# Patient Record
Sex: FEMALE | Race: WHITE | Employment: FULL TIME | URBAN - METROPOLITAN AREA
[De-identification: names, ages, dates, MRNs, and addresses within clinical notes are randomized per-mention and may not be internally consistent; named-entity substitution may affect disease eponyms.]

---

## 2017-03-03 ENCOUNTER — OFFICE VISIT (OUTPATIENT)
Dept: FAMILY MEDICINE CLINIC | Age: 27
End: 2017-03-03

## 2017-03-03 VITALS
BODY MASS INDEX: 42.15 KG/M2 | HEIGHT: 65 IN | HEART RATE: 68 BPM | WEIGHT: 253 LBS | SYSTOLIC BLOOD PRESSURE: 129 MMHG | RESPIRATION RATE: 22 BRPM | OXYGEN SATURATION: 98 % | TEMPERATURE: 98.3 F | DIASTOLIC BLOOD PRESSURE: 82 MMHG

## 2017-03-03 DIAGNOSIS — Z00.00 WELL WOMAN EXAM (NO GYNECOLOGICAL EXAM): Primary | ICD-10-CM

## 2017-03-03 DIAGNOSIS — E66.9 OBESITY (BMI 35.0-39.9 WITHOUT COMORBIDITY): ICD-10-CM

## 2017-03-03 NOTE — MR AVS SNAPSHOT
Visit Information Date & Time Provider Department Dept. Phone Encounter #  
 3/3/2017  1:00 PM Aldo Armas, Everton Hassan [phone] [id number] Follow-up Instructions Return in about 3 months (around 6/3/2017) for weight check. Upcoming Health Maintenance Date Due  
 HPV AGE 9Y-34Y (1 of 3 - Female 3 Dose Series) 8/18/2001 DTaP/Tdap/Td series (1 - Tdap) 8/18/2011 INFLUENZA AGE 9 TO ADULT 8/1/2016 PAP AKA CERVICAL CYTOLOGY 4/8/2017 Allergies as of 3/3/2017  Review Complete On: 3/3/2017 By: Aldo Armas MD  
 No Known Allergies Current Immunizations  Reviewed on 3/3/2017 Name Date Influenza Vaccine 11/1/2013 Reviewed by Kev Flaherty on 3/3/2017 at  1:03 PM  
You Were Diagnosed With   
  
 Codes Comments Well woman exam (no gynecological exam)    -  Primary ICD-10-CM: Z00.00 ICD-9-CM: V70.0 Obesity (BMI 35.0-39.9 without comorbidity) ([facility] [address]     ICD-10-CM: E54.2 ICD-9-CM: 278.00 Vitals OB Status Breastfeeding Preferred Pharmacy Pharmacy Name Phone [facility] [phone] Your Updated Medication List  
  
   
This list is accurate as of: 3/3/17  1:42 PM.  Always use your most recent med list.  
  
  
  
  
 meloxicam 7.5 mg tablet Commonly known as:  MOBIC Take 1 Tab by mouth daily. traMADol-acetaminophen 37.5-325 mg per tablet Commonly known as:  ULTRACET Take 1 Tab by mouth every six (6) hours as needed for Pain. Max Daily Amount: 4 Tabs. We Performed the Following CBC W/O DIFF [20006 CPT(R)] HEMOGLOBIN A1C WITH EAG [68436 CPT(R)] LIPID PANEL [42253 CPT(R)] METABOLIC PANEL, COMPREHENSIVE [26410 CPT(R)] TSH REFLEX TO T4 [[id number] Custom] Follow-up Instructions Return in about 3 months (around 6/3/2017) for weight check. Patient Instructions [email or web address] Introducing [facility] [place] [facility]! Dear Sarai Sanchez: Thank you for requesting a [employer] account. Our records indicate that you already have an active [employer] account. You can access your account anytime at [email or web address] Did you know that you can access your hospital and ER discharge instructions at any time in [employer]? You can also review all of your test results from your hospital stay or ER visit. Additional Information If you have questions, please visit the Frequently Asked Questions section of the [employer] website at [email or web address] Remember, [employer] is NOT to be used for urgent needs. For medical emergencies, dial 911. Now available from your iPhone and Android! Please provide this summary of care documentation to your next provider. Your primary care clinician is listed as Dalila Ames. If you have any questions after today's visit, please call [phone].

## 2017-03-03 NOTE — PROGRESS NOTES
Here for complete physical   Her main concern is weight gain  Has nutritionist and a     I reviewed with the resident the medical history and the resident's findings on the physical examination. I discussed with the resident the patient's diagnosis and concur with the plan.

## 2017-03-03 NOTE — PROGRESS NOTES
HPI:  Carroll Guallpa is a 32 y.o. female presenting for well woman exam.     Last pap smear 4 months ago, normal. Gets OB/GYN care at [facility]. When in [employer], weight was 170-175. After pregnancy, unable to lose weight. Working with , nutritionist.     - Paleo diet, discontinued d/t gallstones  - Currently using myplate.gov, 9986 jaylon a day  - Does not drink soda or added sugars  - Over 4-5 months, has lost 7 lbs  - High intensity interval exercises 5 days a week -- 30 min cardio, lifting muscle groups    Social History:   Does not drink EtOH or smoke. Allergies- reviewed:   No Known Allergies      Medications- reviewed:   Current Outpatient Prescriptions   Medication Sig    traMADol-acetaminophen (ULTRACET) 37.5-325 mg per tablet Take 1 Tab by mouth every six (6) hours as needed for Pain. Max Daily Amount: 4 Tabs.  meloxicam (MOBIC) 7.5 mg tablet Take 1 Tab by mouth daily. No current facility-administered medications for this visit. Past Medical History- reviewed:  Past Medical History:   Diagnosis Date    Anxiety 01/2014    Ovarian cyst     PTSD (post-traumatic stress disorder) 01/2013         Past Surgical History- reviewed:   No past surgical history on file. Family History - reviewed:  Family History   Problem Relation Age of Onset    Endometriosis Mother     Anemia Mother     Bleeding Prob Mother      Anemia    Headache Mother      due to anemia    Hypertension Mother    [facility] Migraines Mother     Thyroid Disease Mother     Liver Disease Father      cirrhosis d/t drinking; hepatitis B     Alcohol abuse Father     Asthma Sister          Social History - reviewed:  Social History     Social History    Marital status: SINGLE     Spouse name: N/A    Number of children: N/A    Years of education: N/A     Occupational History    Not on file.      Social History Main Topics    Smoking status: Never Smoker    Smokeless tobacco: Never Used    Alcohol use No    Drug use: No    Sexual activity: Yes     Partners: Male     Birth control/ protection: Pill     Other Topics Concern    Not on file     Social History Narrative         Immunizations - reviewed:   Immunization History   Administered Date(s) Administered    Influenza Vaccine 11/01/2013       Review of Systems   CONSTITUTIONAL: Denies: weight loss, fever  CARDIOVASCULAR: Denies: chest pain  RESPIRATORY: Denies: cough, shortness of breath  GI: Denies: abdominal pain, nausea, vomiting, diarrhea, constipation  MUSCULOSKELETAL: knee joint pain with running      Physical Exam  Visit Vitals    /82    Pulse 68    Temp 98.3 °F (36.8 °C) (Oral)    Resp 22    Ht 5' 5\" (1.651 m)    Wt 253 lb (114.8 kg)    SpO2 98%    BMI 42.1 kg/m2       General appearance - alert, well appearing, and in no distress  Eyes - EOMI  Nose - normal and patent, no erythema, discharge or polyps  Mouth - mucous membranes moist, pharynx normal without lesions  Chest - clear to auscultation, no wheezes, rales or rhonchi, symmetric air entry  Heart - normal rate, regular rhythm, normal S1, S2, no murmurs, rubs, clicks or gallops  Abdomen - soft, nontender, nondistended, no masses or organomegaly  Neurological - alert, oriented, normal speech, no focal findings or movement disorder noted  Extremities - peripheral pulses normal, no pedal edema, no clubbing or cyanosis  Skin - normal coloration and turgor, no rashes, no suspicious skin lesions noted      Assessment/Plan:    ICD-10-CM ICD-9-CM    1. Well woman exam (no gynecological exam) Z00.00 V70.0 CBC W/O DIFF      METABOLIC PANEL, COMPREHENSIVE      LIPID PANEL      TSH REFLEX TO T4   2. Obesity (BMI 35.0-39.9 without comorbidity) ([facility]) E66.9 278.00 CBC W/O DIFF      METABOLIC PANEL, COMPREHENSIVE      LIPID PANEL      TSH REFLEX TO T4      HEMOGLOBIN A1C WITH EAG         · Counseled re: diet, exercise, healthy lifestyle.  Gave link to help aid with weight loss ([email or web address])    · Appropriate labs, vaccines, imaging studies, and referrals ordered as listed above. Will check for possible metabolic derangements contributing to inability to lose weight. · Discussed the patient's BMI with her. The BMI follow up plan is as follows: already seeing nutritionist, . Will have patient f/u for weight monitoring. Follow-up Disposition:  Return in about 3 months (around 6/3/2017) for weight check. I have discussed the diagnosis with the patient and the intended plan as seen in the above orders. The patient has received an after-visit summary and questions were answered concerning future plans. I have discussed medication side effects and warnings with the patient as well. Informed pt to return to the office if new symptoms arise. Nigel Funez MD  Family Medicine Resident    Patient seen and discussed with Dr. Jovanna Castle, attending physician.

## 2017-03-04 LAB
ALBUMIN SERPL-MCNC: 4.2 G/DL (ref 3.5–5.5)
ALBUMIN/GLOB SERPL: 1.4 {RATIO} (ref 1.1–2.5)
ALP SERPL-CCNC: 86 IU/L (ref 39–117)
ALT SERPL-CCNC: 20 IU/L (ref 0–32)
AST SERPL-CCNC: 20 IU/L (ref 0–40)
BILIRUB SERPL-MCNC: 0.3 MG/DL (ref 0–1.2)
BUN SERPL-MCNC: 12 MG/DL (ref 6–20)
BUN/CREAT SERPL: 17 (ref 8–20)
CALCIUM SERPL-MCNC: 9 MG/DL (ref 8.7–10.2)
CHLORIDE SERPL-SCNC: 98 MMOL/L (ref 96–106)
CHOLEST SERPL-MCNC: 241 MG/DL (ref 100–199)
CO2 SERPL-SCNC: 24 MMOL/L (ref 18–29)
CREAT SERPL-MCNC: 0.71 MG/DL (ref 0.57–1)
ERYTHROCYTE [DISTWIDTH] IN BLOOD BY AUTOMATED COUNT: 14.1 % (ref 12.3–15.4)
EST. AVERAGE GLUCOSE BLD GHB EST-MCNC: 123 MG/DL
GLOBULIN SER CALC-MCNC: 2.9 G/DL (ref 1.5–4.5)
GLUCOSE SERPL-MCNC: 85 MG/DL (ref 65–99)
HBA1C MFR BLD: 5.9 % (ref 4.8–5.6)
HCT VFR BLD AUTO: 40.7 % (ref 34–46.6)
HDLC SERPL-MCNC: 47 MG/DL
HGB BLD-MCNC: 13.9 G/DL (ref 11.1–15.9)
INTERPRETATION, 910389: NORMAL
LDLC SERPL CALC-MCNC: 164 MG/DL (ref 0–99)
MCH RBC QN AUTO: 28.1 PG (ref 26.6–33)
MCHC RBC AUTO-ENTMCNC: 34.2 G/DL (ref 31.5–35.7)
MCV RBC AUTO: 82 FL (ref 79–97)
PLATELET # BLD AUTO: 315 X10E3/UL (ref 150–379)
POTASSIUM SERPL-SCNC: 4.6 MMOL/L (ref 3.5–5.2)
PROT SERPL-MCNC: 7.1 G/DL (ref 6–8.5)
RBC # BLD AUTO: 4.94 X10E6/UL (ref 3.77–5.28)
SODIUM SERPL-SCNC: 141 MMOL/L (ref 134–144)
TRIGL SERPL-MCNC: 151 MG/DL (ref 0–149)
TSH SERPL DL<=0.005 MIU/L-ACNC: 1.29 UIU/ML (ref 0.45–4.5)
VLDLC SERPL CALC-MCNC: 30 MG/DL (ref 5–40)
WBC # BLD AUTO: 9.3 X10E3/UL (ref 3.4–10.8)

## 2017-03-07 NOTE — PROGRESS NOTES
High lipids, though little evidence on initiating statin in young patient. Continues to be in pre-diabetic range. Suspect diet is not as strict as patient reports. Will discuss results with patient.

## 2022-10-05 ENCOUNTER — HOSPITAL ENCOUNTER (EMERGENCY)
Age: 32
Discharge: HOME | End: 2022-10-05
Attending: STUDENT IN AN ORGANIZED HEALTH CARE EDUCATION/TRAINING PROGRAM
Payer: MEDICAID

## 2022-10-05 VITALS
WEIGHT: 230 LBS | OXYGEN SATURATION: 100 % | DIASTOLIC BLOOD PRESSURE: 82 MMHG | SYSTOLIC BLOOD PRESSURE: 165 MMHG | HEIGHT: 65 IN | TEMPERATURE: 97.9 F | HEART RATE: 86 BPM | BODY MASS INDEX: 38.32 KG/M2 | RESPIRATION RATE: 18 BRPM

## 2022-10-05 DIAGNOSIS — M54.2 CERVICALGIA: Primary | ICD-10-CM

## 2022-10-05 DIAGNOSIS — S46.812A TRAPEZIUS MUSCLE STRAIN, LEFT, INITIAL ENCOUNTER: ICD-10-CM

## 2022-10-05 PROCEDURE — 74011636637 HC RX REV CODE- 636/637: Performed by: STUDENT IN AN ORGANIZED HEALTH CARE EDUCATION/TRAINING PROGRAM

## 2022-10-05 PROCEDURE — 74011250636 HC RX REV CODE- 250/636: Performed by: STUDENT IN AN ORGANIZED HEALTH CARE EDUCATION/TRAINING PROGRAM

## 2022-10-05 PROCEDURE — 99284 EMERGENCY DEPT VISIT MOD MDM: CPT

## 2022-10-05 PROCEDURE — 96374 THER/PROPH/DIAG INJ IV PUSH: CPT

## 2022-10-05 PROCEDURE — 74011000250 HC RX REV CODE- 250: Performed by: STUDENT IN AN ORGANIZED HEALTH CARE EDUCATION/TRAINING PROGRAM

## 2022-10-05 RX ORDER — LIDOCAINE 4 G/100G
1 PATCH TOPICAL EVERY 24 HOURS
Status: DISCONTINUED | OUTPATIENT
Start: 2022-10-05 | End: 2022-10-05 | Stop reason: HOSPADM

## 2022-10-05 RX ORDER — KETOROLAC TROMETHAMINE 30 MG/ML
30 INJECTION, SOLUTION INTRAMUSCULAR; INTRAVENOUS
Status: COMPLETED | OUTPATIENT
Start: 2022-10-05 | End: 2022-10-05

## 2022-10-05 RX ORDER — METHOCARBAMOL 750 MG/1
750 TABLET, FILM COATED ORAL 4 TIMES DAILY
Qty: 12 TABLET | Refills: 0 | Status: SHIPPED | OUTPATIENT
Start: 2022-10-05 | End: 2022-10-08

## 2022-10-05 RX ORDER — PREDNISONE 20 MG/1
60 TABLET ORAL
Status: COMPLETED | OUTPATIENT
Start: 2022-10-05 | End: 2022-10-05

## 2022-10-05 RX ORDER — PREDNISONE 50 MG/1
50 TABLET ORAL DAILY
Qty: 3 TABLET | Refills: 0 | Status: SHIPPED | OUTPATIENT
Start: 2022-10-06 | End: 2022-10-09

## 2022-10-05 RX ADMIN — PREDNISONE 60 MG: 20 TABLET ORAL at 20:45

## 2022-10-05 RX ADMIN — KETOROLAC TROMETHAMINE 30 MG: 30 INJECTION, SOLUTION INTRAMUSCULAR at 20:46

## 2022-10-06 NOTE — ED PROVIDER NOTES
Patient is a 27-year-old female who presents to ED complaining of left trapezius pain which started yesterday. Patient reports slight pain and a tingling sensation in her left trapezius. Patient reports today she has noticed a tingling sensation that travels to her shoulder and down the posterior aspect of her left arm and stops at the elbow. Patient denies any known injury. States she has full range of motion of the arm denies any true numbness. States it feels like when her foot \"falls asleep. \"  Patient denies any neck pain, headache, visual changes, speech difficulty, confusion, weakness. No meds prior to arrival.       Past Medical History:   Diagnosis Date    Anxiety 01/2014    Ovarian cyst     PTSD (post-traumatic stress disorder) 01/2013       No past surgical history on file.       Family History:   Problem Relation Age of Onset    Endometriosis Mother     Anemia Mother     Bleeding Prob Mother         Anemia    Headache Mother         due to anemia    Hypertension Mother     Migraines Mother     Thyroid Disease Mother     Liver Disease Father         cirrhosis d/t drinking; hepatitis B     Alcohol abuse Father     Asthma Sister        Social History     Socioeconomic History    Marital status: SINGLE     Spouse name: Not on file    Number of children: Not on file    Years of education: Not on file    Highest education level: Not on file   Occupational History    Not on file   Tobacco Use    Smoking status: Never    Smokeless tobacco: Never   Substance and Sexual Activity    Alcohol use: No     Alcohol/week: 15.8 standard drinks     Types: 12 Cans of beer, 7 Shots of liquor per week    Drug use: No    Sexual activity: Yes     Partners: Male     Birth control/protection: Pill   Other Topics Concern    Not on file   Social History Narrative    Not on file     Social Determinants of Health     Financial Resource Strain: Not on file   Food Insecurity: Not on file   Transportation Needs: Not on file Physical Activity: Not on file   Stress: Not on file   Social Connections: Not on file   Intimate Partner Violence: Not on file   Housing Stability: Not on file         ALLERGIES: Patient has no known allergies. Review of Systems   Constitutional:  Negative for activity change, appetite change, chills and fever. HENT:  Negative for congestion and sore throat. Eyes:  Negative for pain and visual disturbance. Respiratory:  Negative for cough and shortness of breath. Cardiovascular:  Negative for chest pain, palpitations and leg swelling. Gastrointestinal:  Negative for abdominal distention, abdominal pain, constipation, diarrhea, nausea and vomiting. Genitourinary:  Negative for decreased urine volume, dysuria, flank pain, frequency and urgency. Musculoskeletal:  Positive for myalgias. Negative for arthralgias, back pain, gait problem, joint swelling, neck pain and neck stiffness. Skin:  Negative for rash and wound. Allergic/Immunologic: Negative for immunocompromised state. Neurological:  Positive for numbness. Negative for dizziness, tremors, seizures, syncope, facial asymmetry, speech difficulty, weakness, light-headedness and headaches. Psychiatric/Behavioral:  Negative for confusion. All other systems reviewed and are negative. Vitals:    10/05/22 2028   BP: (!) 165/82   Pulse: 86   Resp: 18   Temp: 97.9 °F (36.6 °C)   SpO2: 100%   Weight: 104.3 kg (230 lb)   Height: 5' 5\" (1.651 m)            Physical Exam  Vitals and nursing note reviewed. Constitutional:       General: She is not in acute distress. Appearance: Normal appearance. She is well-developed. She is not toxic-appearing. HENT:      Head: Normocephalic and atraumatic. Nose: Nose normal.      Mouth/Throat:      Mouth: Mucous membranes are moist.   Eyes:      General: Lids are normal.      Extraocular Movements: Extraocular movements intact.       Conjunctiva/sclera: Conjunctivae normal.   Neck: Comments: No midline cervical tenderness or paraspinal muscle tenderness. Cardiovascular:      Rate and Rhythm: Normal rate and regular rhythm. Pulses: Normal pulses. Heart sounds: Normal heart sounds, S1 normal and S2 normal.   Pulmonary:      Effort: Pulmonary effort is normal. No accessory muscle usage. Breath sounds: Normal breath sounds. Abdominal:      Palpations: Abdomen is soft. Musculoskeletal:         General: Normal range of motion. Cervical back: Normal range of motion and neck supple. Comments: Left trapezius tenderness noted with muscle spasm. Full ROM of bilateral upper extremities. Sensation is intact to dull and sharp sensation bilaterally in upper extremities. Strength 5/5 with  strength, elbow flexion/extension bilaterally. No shoulder or elbow tenderness noted. Skin:     General: Skin is warm and dry. Capillary Refill: Capillary refill takes less than 2 seconds. Neurological:      General: No focal deficit present. Mental Status: She is alert and oriented to person, place, and time. Mental status is at baseline. Psychiatric:         Attention and Perception: Attention normal.         Mood and Affect: Mood and affect normal.         Speech: Speech normal.         Behavior: Behavior is cooperative. Thought Content: Thought content normal.         Cognition and Memory: Cognition normal.         Judgment: Judgment normal.        MDM  Number of Diagnoses or Management Options  Cervicalgia  Trapezius muscle strain, left, initial encounter  Diagnosis management comments: Patient presents with tingling sensation in left trapezius and left arm stopping at the elbow. No focal neurologic deficits on exam. Symptoms seem radicular in nature and patient has pinpoint muscle tenderness in left trapezius. Given prednisone and toradol with improvement of symptoms. Discussed treatment plan and follow-up with PCP.  Strict return to ER precautions discussed in detail with patient. All questions addressed and answered.        Amount and/or Complexity of Data Reviewed  Discuss the patient with other providers: yes (Dr. Martita Larsen, ED Attending )           Procedures

## 2022-10-06 NOTE — DISCHARGE INSTRUCTIONS
Take prednisone as prescribed. Alternate tylenol and ibuprofen every 4 hours as needed for pain. If you are experiencing muscle spasm take robaxin. . You can also use over the counter lidocaine patches to help with pain relief.

## 2022-10-06 NOTE — ED TRIAGE NOTES
Patient reports intermittent numbness from left shoulder blade to mid back and down to left elbow since yesterday.      VAN -     Denies any known injury or loss of bowel/bladder

## 2023-03-10 ENCOUNTER — NURSE TRIAGE (OUTPATIENT)
Dept: OTHER | Facility: CLINIC | Age: 33
End: 2023-03-10

## 2023-03-21 ENCOUNTER — HOSPITAL ENCOUNTER (EMERGENCY)
Age: 33
Discharge: HOME | End: 2023-03-21
Attending: STUDENT IN AN ORGANIZED HEALTH CARE EDUCATION/TRAINING PROGRAM
Payer: MEDICAID

## 2023-03-21 ENCOUNTER — APPOINTMENT (OUTPATIENT)
Dept: MRI IMAGING | Age: 33
End: 2023-03-21
Attending: INTERNAL MEDICINE
Payer: MEDICAID

## 2023-03-21 ENCOUNTER — APPOINTMENT (OUTPATIENT)
Dept: CT IMAGING | Age: 33
End: 2023-03-21
Attending: STUDENT IN AN ORGANIZED HEALTH CARE EDUCATION/TRAINING PROGRAM
Payer: MEDICAID

## 2023-03-21 ENCOUNTER — APPOINTMENT (OUTPATIENT)
Dept: NON INVASIVE DIAGNOSTICS | Age: 33
End: 2023-03-21
Attending: INTERNAL MEDICINE
Payer: MEDICAID

## 2023-03-21 VITALS
HEART RATE: 74 BPM | OXYGEN SATURATION: 99 % | RESPIRATION RATE: 14 BRPM | HEIGHT: 65 IN | DIASTOLIC BLOOD PRESSURE: 65 MMHG | SYSTOLIC BLOOD PRESSURE: 119 MMHG | WEIGHT: 229.94 LBS | TEMPERATURE: 98.4 F | BODY MASS INDEX: 38.31 KG/M2

## 2023-03-21 DIAGNOSIS — R20.0 RIGHT FACIAL NUMBNESS: Primary | ICD-10-CM

## 2023-03-21 LAB
ALBUMIN SERPL-MCNC: 3.3 G/DL (ref 3.5–5)
ALBUMIN/GLOB SERPL: 0.8 (ref 1.1–2.2)
ALP SERPL-CCNC: 52 U/L (ref 45–117)
ALT SERPL-CCNC: 23 U/L (ref 12–78)
ANION GAP SERPL CALC-SCNC: 4 MMOL/L (ref 5–15)
AST SERPL-CCNC: 26 U/L (ref 15–37)
ATRIAL RATE: 64 BPM
BASOPHILS # BLD: 0.1 K/UL (ref 0–0.1)
BASOPHILS NFR BLD: 1 % (ref 0–1)
BILIRUB SERPL-MCNC: 0.8 MG/DL (ref 0.2–1)
BUN SERPL-MCNC: 11 MG/DL (ref 6–20)
BUN/CREAT SERPL: 14 (ref 12–20)
CALCIUM SERPL-MCNC: 8.9 MG/DL (ref 8.5–10.1)
CALCULATED P AXIS, ECG09: 50 DEGREES
CALCULATED R AXIS, ECG10: 59 DEGREES
CALCULATED T AXIS, ECG11: 5 DEGREES
CHLORIDE SERPL-SCNC: 105 MMOL/L (ref 97–108)
CO2 SERPL-SCNC: 29 MMOL/L (ref 21–32)
CREAT SERPL-MCNC: 0.8 MG/DL (ref 0.55–1.02)
DIAGNOSIS, 93000: NORMAL
DIFFERENTIAL METHOD BLD: NORMAL
ECHO AO ASC DIAM: 2.7 CM
ECHO AO ASCENDING AORTA INDEX: 1.29 CM/M2
ECHO AV AREA PEAK VELOCITY: 2.4 CM2
ECHO AV AREA VTI: 2.4 CM2
ECHO AV AREA/BSA PEAK VELOCITY: 1.1 CM2/M2
ECHO AV AREA/BSA VTI: 1.1 CM2/M2
ECHO AV MEAN GRADIENT: 4 MMHG
ECHO AV MEAN VELOCITY: 0.9 M/S
ECHO AV PEAK GRADIENT: 6 MMHG
ECHO AV PEAK VELOCITY: 1.3 M/S
ECHO AV VELOCITY RATIO: 0.77
ECHO AV VTI: 24.3 CM
ECHO LA DIAMETER INDEX: 1.43 CM/M2
ECHO LA DIAMETER: 3 CM
ECHO LA VOL 2C: 31 ML (ref 22–52)
ECHO LA VOL 4C: 32 ML (ref 22–52)
ECHO LA VOL BP: 36 ML (ref 22–52)
ECHO LA VOL/BSA BIPLANE: 17 ML/M2 (ref 16–34)
ECHO LA VOLUME AREA LENGTH: 40 ML
ECHO LA VOLUME INDEX A2C: 15 ML/M2 (ref 16–34)
ECHO LA VOLUME INDEX A4C: 15 ML/M2 (ref 16–34)
ECHO LA VOLUME INDEX AREA LENGTH: 19 ML/M2 (ref 16–34)
ECHO LV E' LATERAL VELOCITY: 11 CM/S
ECHO LV E' SEPTAL VELOCITY: 12 CM/S
ECHO LV EDV A2C: 86 ML
ECHO LV EDV A4C: 124 ML
ECHO LV EDV BP: 102 ML (ref 56–104)
ECHO LV EDV INDEX A4C: 59 ML/M2
ECHO LV EDV INDEX BP: 49 ML/M2
ECHO LV EDV NDEX A2C: 41 ML/M2
ECHO LV EJECTION FRACTION A2C: 63 %
ECHO LV EJECTION FRACTION A4C: 61 %
ECHO LV EJECTION FRACTION BIPLANE: 61 % (ref 55–100)
ECHO LV ESV A2C: 32 ML
ECHO LV ESV A4C: 49 ML
ECHO LV ESV BP: 40 ML (ref 19–49)
ECHO LV ESV INDEX A2C: 15 ML/M2
ECHO LV ESV INDEX A4C: 23 ML/M2
ECHO LV ESV INDEX BP: 19 ML/M2
ECHO LV FRACTIONAL SHORTENING: 37 % (ref 28–44)
ECHO LV INTERNAL DIMENSION DIASTOLE INDEX: 2.33 CM/M2
ECHO LV INTERNAL DIMENSION DIASTOLIC: 4.9 CM (ref 3.9–5.3)
ECHO LV INTERNAL DIMENSION SYSTOLIC INDEX: 1.48 CM/M2
ECHO LV INTERNAL DIMENSION SYSTOLIC: 3.1 CM
ECHO LV IVSD: 0.9 CM (ref 0.6–0.9)
ECHO LV MASS 2D: 164.3 G (ref 67–162)
ECHO LV MASS INDEX 2D: 78.2 G/M2 (ref 43–95)
ECHO LV POSTERIOR WALL DIASTOLIC: 1 CM (ref 0.6–0.9)
ECHO LV RELATIVE WALL THICKNESS RATIO: 0.41
ECHO LVOT AREA: 2.8 CM2
ECHO LVOT AV VTI INDEX: 0.82
ECHO LVOT DIAM: 1.9 CM
ECHO LVOT MEAN GRADIENT: 2 MMHG
ECHO LVOT PEAK GRADIENT: 4 MMHG
ECHO LVOT PEAK VELOCITY: 1 M/S
ECHO LVOT STROKE VOLUME INDEX: 26.9 ML/M2
ECHO LVOT SV: 56.4 ML
ECHO LVOT VTI: 19.9 CM
ECHO MV A VELOCITY: 0.43 M/S
ECHO MV E DECELERATION TIME (DT): 146.1 MS
ECHO MV E VELOCITY: 0.94 M/S
ECHO MV E/A RATIO: 2.19
ECHO MV E/E' LATERAL: 8.55
ECHO MV E/E' RATIO (AVERAGED): 8.19
ECHO MV E/E' SEPTAL: 7.83
ECHO PULMONARY ARTERY END DIASTOLIC PRESSURE: 2 MMHG
ECHO PV MAX VELOCITY: 1.1 M/S
ECHO PV PEAK GRADIENT: 5 MMHG
ECHO PV REGURGITANT MAX VELOCITY: 0.7 M/S
ECHO RV INTERNAL DIMENSION: 4.1 CM
ECHO RV TAPSE: 2.1 CM (ref 1.7–?)
ECHO RVOT MEAN GRADIENT: 1 MMHG
ECHO RVOT PEAK GRADIENT: 3 MMHG
ECHO RVOT PEAK VELOCITY: 0.8 M/S
ECHO RVOT VTI: 17.3 CM
ECHO TV REGURGITANT MAX VELOCITY: 1.53 M/S
ECHO TV REGURGITANT PEAK GRADIENT: 10 MMHG
EOSINOPHIL # BLD: 0.1 K/UL (ref 0–0.4)
EOSINOPHIL NFR BLD: 1 % (ref 0–7)
ERYTHROCYTE [DISTWIDTH] IN BLOOD BY AUTOMATED COUNT: 12.7 % (ref 11.5–14.5)
EST. AVERAGE GLUCOSE BLD GHB EST-MCNC: 103 MG/DL
GLOBULIN SER CALC-MCNC: 4.1 G/DL (ref 2–4)
GLUCOSE BLD STRIP.AUTO-MCNC: 92 MG/DL (ref 65–117)
GLUCOSE SERPL-MCNC: 96 MG/DL (ref 65–100)
HBA1C MFR BLD: 5.2 % (ref 4–5.6)
HCG UR QL: NEGATIVE
HCT VFR BLD AUTO: 40.2 % (ref 35–47)
HGB BLD-MCNC: 13.3 G/DL (ref 11.5–16)
IMM GRANULOCYTES # BLD AUTO: 0 K/UL (ref 0–0.04)
IMM GRANULOCYTES NFR BLD AUTO: 0 % (ref 0–0.5)
INR PPP: 1 (ref 0.9–1.1)
LYMPHOCYTES # BLD: 2.1 K/UL (ref 0.8–3.5)
LYMPHOCYTES NFR BLD: 32 % (ref 12–49)
MCH RBC QN AUTO: 28.7 PG (ref 26–34)
MCHC RBC AUTO-ENTMCNC: 33.1 G/DL (ref 30–36.5)
MCV RBC AUTO: 86.8 FL (ref 80–99)
MONOCYTES # BLD: 0.5 K/UL (ref 0–1)
MONOCYTES NFR BLD: 8 % (ref 5–13)
NEUTS SEG # BLD: 3.8 K/UL (ref 1.8–8)
NEUTS SEG NFR BLD: 58 % (ref 32–75)
NRBC # BLD: 0 K/UL (ref 0–0.01)
NRBC BLD-RTO: 0 PER 100 WBC
P-R INTERVAL, ECG05: 136 MS
PLATELET # BLD AUTO: 310 K/UL (ref 150–400)
PMV BLD AUTO: 10.7 FL (ref 8.9–12.9)
POTASSIUM SERPL-SCNC: 4.2 MMOL/L (ref 3.5–5.1)
PROT SERPL-MCNC: 7.4 G/DL (ref 6.4–8.2)
PROTHROMBIN TIME: 10.7 SEC (ref 9–11.1)
Q-T INTERVAL, ECG07: 438 MS
QRS DURATION, ECG06: 84 MS
QTC CALCULATION (BEZET), ECG08: 451 MS
RBC # BLD AUTO: 4.63 M/UL (ref 3.8–5.2)
SERVICE CMNT-IMP: NORMAL
SODIUM SERPL-SCNC: 138 MMOL/L (ref 136–145)
VENTRICULAR RATE, ECG03: 64 BPM
WBC # BLD AUTO: 6.6 K/UL (ref 3.6–11)

## 2023-03-21 PROCEDURE — 80053 COMPREHEN METABOLIC PANEL: CPT

## 2023-03-21 PROCEDURE — 0042T CT CODE NEURO PERF W CBF: CPT

## 2023-03-21 PROCEDURE — 74011000636 HC RX REV CODE- 636: Performed by: STUDENT IN AN ORGANIZED HEALTH CARE EDUCATION/TRAINING PROGRAM

## 2023-03-21 PROCEDURE — 96374 THER/PROPH/DIAG INJ IV PUSH: CPT

## 2023-03-21 PROCEDURE — 80061 LIPID PANEL: CPT

## 2023-03-21 PROCEDURE — 93005 ELECTROCARDIOGRAM TRACING: CPT

## 2023-03-21 PROCEDURE — 85610 PROTHROMBIN TIME: CPT

## 2023-03-21 PROCEDURE — 81025 URINE PREGNANCY TEST: CPT

## 2023-03-21 PROCEDURE — 70553 MRI BRAIN STEM W/O & W/DYE: CPT

## 2023-03-21 PROCEDURE — 70450 CT HEAD/BRAIN W/O DYE: CPT

## 2023-03-21 PROCEDURE — 83036 HEMOGLOBIN GLYCOSYLATED A1C: CPT

## 2023-03-21 PROCEDURE — 36415 COLL VENOUS BLD VENIPUNCTURE: CPT

## 2023-03-21 PROCEDURE — 93306 TTE W/DOPPLER COMPLETE: CPT | Performed by: SPECIALIST

## 2023-03-21 PROCEDURE — 99285 EMERGENCY DEPT VISIT HI MDM: CPT

## 2023-03-21 PROCEDURE — 85025 COMPLETE CBC W/AUTO DIFF WBC: CPT

## 2023-03-21 PROCEDURE — 74011250636 HC RX REV CODE- 250/636: Performed by: STUDENT IN AN ORGANIZED HEALTH CARE EDUCATION/TRAINING PROGRAM

## 2023-03-21 PROCEDURE — 82962 GLUCOSE BLOOD TEST: CPT

## 2023-03-21 PROCEDURE — A9576 INJ PROHANCE MULTIPACK: HCPCS | Performed by: RADIOLOGY

## 2023-03-21 PROCEDURE — 74011250636 HC RX REV CODE- 250/636: Performed by: RADIOLOGY

## 2023-03-21 PROCEDURE — 70496 CT ANGIOGRAPHY HEAD: CPT

## 2023-03-21 RX ORDER — ACETAMINOPHEN 650 MG/1
650 SUPPOSITORY RECTAL
Status: DISCONTINUED | OUTPATIENT
Start: 2023-03-21 | End: 2023-03-21 | Stop reason: HOSPADM

## 2023-03-21 RX ORDER — ACETAMINOPHEN 325 MG/1
650 TABLET ORAL
Status: DISCONTINUED | OUTPATIENT
Start: 2023-03-21 | End: 2023-03-21 | Stop reason: HOSPADM

## 2023-03-21 RX ORDER — CLOPIDOGREL BISULFATE 75 MG/1
300 TABLET ORAL ONCE
Status: DISCONTINUED | OUTPATIENT
Start: 2023-03-21 | End: 2023-03-21

## 2023-03-21 RX ORDER — GUAIFENESIN 100 MG/5ML
243 LIQUID (ML) ORAL
Status: DISCONTINUED | OUTPATIENT
Start: 2023-03-21 | End: 2023-03-21

## 2023-03-21 RX ORDER — GUAIFENESIN 100 MG/5ML
81 LIQUID (ML) ORAL DAILY
Status: DISCONTINUED | OUTPATIENT
Start: 2023-03-22 | End: 2023-03-21 | Stop reason: HOSPADM

## 2023-03-21 RX ADMIN — GADOTERIDOL 20 ML: 279.3 INJECTION, SOLUTION INTRAVENOUS at 12:16

## 2023-03-21 RX ADMIN — IOPAMIDOL 140 ML: 755 INJECTION, SOLUTION INTRAVENOUS at 09:03

## 2023-03-21 RX ADMIN — SODIUM CHLORIDE 1000 ML: 9 INJECTION, SOLUTION INTRAVENOUS at 14:16

## 2023-03-21 NOTE — DISCHARGE INSTRUCTIONS
HOSPITALIST DISCHARGE INSTRUCTIONS  NAME: Veronica Agarwal   :  1990   MRN:  [id number]     Date/Time:  3/21/2023 1:56 PM    ADMIT DATE: 3/21/2023     DISCHARGE DATE: 3/21/2023     ADMITTING DIAGNOSIS:  R sided facial numbness/tingling     DISCHARGE DIAGNOSIS:  As above MRI negative for acute process    MEDICATIONS:     It is important that you take the medication exactly as they are prescribed. Keep your medication in the bottles provided by the pharmacist and keep a list of the medication names, dosages, and times to be taken in your wallet. Do not take other medications without consulting your doctor. Pain Management: per above medications    What to do at Home    Recommended diet:  Regular Diet    Recommended activity: Activity as tolerated    If you experience any of the following symptoms then please call your primary care physician or return to the emergency room if you cannot get hold of your doctor:  Fever, chills, nausea, vomiting, diarrhea, change in mentation, falling, bleeding, shortness of breath, chest pain     Follow Up:  Dr. Smith  you are to call and set up an appointment to see them in 2 weeks. Information obtained by :  I understand that if any problems occur once I am at home I am to contact my physician. I understand and acknowledge receipt of the instructions indicated above.                                                                                                                                            Physician's or R.N.'s Signature                                                                  Date/Time                                                                                                                                              Patient or Representative Signature                                                          Date/Time

## 2023-03-21 NOTE — ED TRIAGE NOTES
Pt arrives to the ER for complaints of right sided facial numbness that she woke up with this morning around 0645. Denies any slurred speech, changes in vision, or numbness in tingling in extremities. Pt reports she takes a baby aspirin daily.

## 2023-03-21 NOTE — ED NOTES
Pt has been evaluated and cleared by Phuc Anderson neuro NP. Discharge instructions reviewed with pt by provider. pt verbalized understanding of discharge instructions. Copy of discharge paperwork given. Patient condition stable, respiratory status within normal limits, neuro status intact.  Ambulatory out of er

## 2023-03-21 NOTE — ED NOTES
Assumed care of patient, pt HUMPHREY for echo. Patient condition stable, respiratory status within normal limits, neuro status intact.

## 2023-03-21 NOTE — ED PROVIDER NOTES
75-year-old female presents for right facial numbness which was present when she woke up this morning. She went to bed at 9:30 PM which is her last known well. Level 2 neuro alert called when I evaluated the patient at triage. NIH stroke score 1 on my evaluation. Patient reports right-sided facial numbness but no motor weakness, no difficulty swallowing, no hearing or visual deficit. Patient states that she had a similar episode last year where her right arm went completely numb temporarily. She reports she was admitted at that time at a [address] [place] [address] in [facility] and had an MRI which was reportedly normal.  We do not have access to those records. Since then, patient has been taking a baby aspirin daily. Otherwise, patient reports a history of anxiety, PTSD, ovarian cysts. The history is provided by the patient. Numbness  Pertinent negatives include no shortness of breath, no chest pain, no vomiting, no confusion and no headaches. Past Medical History:   Diagnosis Date    Anxiety 01/2014    Ovarian cyst     PTSD (post-traumatic stress disorder) 01/2013       No past surgical history on file.       Family History:   Problem Relation Age of Onset    Endometriosis Mother     Anemia Mother     Bleeding Prob Mother         Anemia    Headache Mother         due to anemia    Hypertension Mother     Migraines Mother     Thyroid Disease Mother     Liver Disease Father         cirrhosis d/t drinking; hepatitis B     Alcohol abuse Father     Asthma Sister        Social History     Socioeconomic History    Marital status:      Spouse name: Not on file    Number of children: Not on file    Years of education: Not on file    Highest education level: Not on file   Occupational History    Not on file   Tobacco Use    Smoking status: Never    Smokeless tobacco: Never   Substance and Sexual Activity    Alcohol use: No     Alcohol/week: 15.8 standard drinks     Types: 12 Cans of beer, 7 Shots of liquor per week    Drug use: No    Sexual activity: Yes     Partners: Male     Birth control/protection: Pill   Other Topics Concern    Not on file   Social History Narrative    Not on file     Social Determinants of Health     Financial Resource Strain: Not on file   Food Insecurity: Not on file   Transportation Needs: Not on file   Physical Activity: Not on file   Stress: Not on file   Social Connections: Not on file   Intimate Partner Violence: Not on file   Housing Stability: Not on file         ALLERGIES: Patient has no known allergies. Review of Systems   Constitutional:  Negative for fever. HENT:  Negative for congestion and sore throat. Eyes:  Negative for visual disturbance. Respiratory:  Negative for cough and shortness of breath. Cardiovascular:  Negative for chest pain. Gastrointestinal:  Negative for abdominal pain, diarrhea and vomiting. Genitourinary:  Negative for dysuria. Musculoskeletal:  Negative for gait problem. Skin:  Negative for rash. Neurological:  Positive for numbness. Negative for syncope and headaches. Psychiatric/Behavioral:  Negative for confusion. Vitals:    03/21/23 0832 03/21/23 1031   BP: (!) 144/86 118/66   Pulse: 76 64   Resp: 16 16   Temp: 98.7 °F (37.1 °C) 98.3 °F (36.8 °C)   SpO2: 97% 98%   Weight: 104.3 kg (230 lb)    Height: 5' 5\" (1.651 m)             Physical Exam  Vitals and nursing note reviewed. Constitutional:       General: She is not in acute distress. Appearance: Normal appearance. HENT:      Head: Normocephalic and atraumatic. Nose: No congestion. Mouth/Throat:      Mouth: Mucous membranes are moist.   Eyes:      General: No visual field deficit. Conjunctiva/sclera: Conjunctivae normal.   Cardiovascular:      Rate and Rhythm: Normal rate and regular rhythm. Pulses: Normal pulses. Pulmonary:      Effort: Pulmonary effort is normal.      Breath sounds: Normal breath sounds. Abdominal:      Palpations: Abdomen is soft. Tenderness: There is no abdominal tenderness. Musculoskeletal:         General: Normal range of motion. Skin:     General: Skin is warm and dry. Neurological:      General: No focal deficit present. Mental Status: She is alert and oriented to person, place, and time. Cranial Nerves: No dysarthria or facial asymmetry. Sensory: Sensory deficit present. Motor: Motor function is intact. No weakness or tremor. Coordination: Coordination is intact. Romberg sign negative. Coordination normal. Finger-Nose-Finger Test normal.      Comments: R facial paresthesias including forehead, ear, cheek, not including chin. Normal facial motion, normal speech   Psychiatric:         Mood and Affect: Mood normal.         Behavior: Behavior normal.        Medical Decision Making  26-year-old female presents for facial paresthesias. She has a history of similar previous episode in her right arm and reportedly had reassuring work-up at that time. Discussed with telemetry neurology Dr. Doreen Garrison who recommended MRI with and without contrast, aspirin, Plavix. Workup otherwise reassuring for acute emergent etiology. Discussed with hospitalist.  As patient is just waiting on MRI, she recommended holding her in the emergency department to obtain MRI which we did. MRI does not show acute findings. Hospitalist discharged the patient after neurology evaluated her and agreed with plan of care. NIHSS Stroke Scale      Interval: Baseline  Time: 8:35 AM    Person Administering Scale: Onel Mantilla MD      1a  Level of consciousness: 0=alert; keenly responsive  1b. LOC questions:  0=Answers both questions correctly  1c. LOC commands: 0=Performs both tasks correctly  2. Best Gaze: 0=normal  3. Visual: 0=No visual loss  4. Facial Palsy: 0=Normal symmetric movement  5a. Motor left arm: 0=No drift, arm holds 90 (or 45) degrees for full 10 seconds  5b.   Motor right arm: 0=No drift, arm holds 90 (or 45) degrees for full 10 seconds  6a. Motor left le=No drift; leg holds 30-degree position for full 5 seconds. 6b  Motor right le=No drift; leg holds 30-degree position for full 5 seconds. 7. Limb Ataxia: 0=Absent  8. Sensory: 1=Mild to moderate sensory loss; patient feels pinprick is less sharp or is dull on the affected side; or there is a loss of superficial pain with pinprick but patient is aware of being touched. 9. Best Language:  0=No aphasia, normal  10. Dysarthria: 0=Normal  11. Extinction and Inattention: 0=No abnormality   Total:   1-4= Minor stroke        Perfect Serve Consult   10:39 AM    ED Room Number: ERJ/J  Patient Name and age:  Clint Ball 28 y.o.  female  Working Diagnosis: Right facial numbness, suspected CVA    COVID-19 Suspicion:  no  Sepsis present:  no  Reassessment needed: no  Code Status:  Full Code  Readmission: no  Isolation Requirements:  no  Recommended Level of Care:  telemetry  Department: [facility] - [phone]      Amount and/or Complexity of Data Reviewed  Labs: ordered. Radiology: ordered. ECG/medicine tests: ordered. Risk  OTC drugs. Prescription drug management. Decision regarding hospitalization. Procedures           I personally reviewed and independently interpreted EKG, labs and imaging results.     EKG Interpretation rate 64, QTc 451, T wave biphasic in lead III, NSR, narrow QRS, nl intervals, no CARLO/STD    LABORATORY TESTS:  Admission on 2023, Discharged on 2023   Component Date Value Ref Range Status    Ventricular Rate 2023 64  BPM Preliminary    Atrial Rate 2023 64  BPM Preliminary    P-R Interval 2023 136  ms Preliminary    QRS Duration 2023 84  ms Preliminary    Q-T Interval 2023 438  ms Preliminary    QTC Calculation (Bezet) 2023 451  ms Preliminary    Calculated P Axis 2023 50  degrees Preliminary    Calculated R Axis 2023 59  degrees Preliminary    Calculated T Axis 03/21/2023 5  degrees Preliminary    Diagnosis 03/21/2023    Preliminary                    Value:Normal sinus rhythm with sinus arrhythmia  Normal ECG  When compared with ECG of 02-SEP-2016 22:14,  No significant change was found      WBC 03/21/2023 6.6  3.6 - 11.0 K/uL Final    RBC 03/21/2023 4.63  3.80 - 5.20 M/uL Final    HGB 03/21/2023 13.3  11.5 - 16.0 g/dL Final    HCT 03/21/2023 40.2  35.0 - 47.0 % Final    MCV 03/21/2023 86.8  80.0 - 99.0 FL Final    MCH 03/21/2023 28.7  26.0 - 34.0 PG Final    MCHC 03/21/2023 33.1  30.0 - 36.5 g/dL Final    RDW 03/21/2023 12.7  11.5 - 14.5 % Final    PLATELET 76/85/8412 458  150 - 400 K/uL Final    MPV 03/21/2023 10.7  8.9 - 12.9 FL Final    NRBC 03/21/2023 0.0  0  WBC Final    ABSOLUTE NRBC 03/21/2023 0.00  0.00 - 0.01 K/uL Final    NEUTROPHILS 03/21/2023 58  32 - 75 % Final    LYMPHOCYTES 03/21/2023 32  12 - 49 % Final    MONOCYTES 03/21/2023 8  5 - 13 % Final    EOSINOPHILS 03/21/2023 1  0 - 7 % Final    BASOPHILS 03/21/2023 1  0 - 1 % Final    IMMATURE GRANULOCYTES 03/21/2023 0  0.0 - 0.5 % Final    ABS. NEUTROPHILS 03/21/2023 3.8  1.8 - 8.0 K/UL Final    ABS. LYMPHOCYTES 03/21/2023 2.1  0.8 - 3.5 K/UL Final    ABS. MONOCYTES 03/21/2023 0.5  0.0 - 1.0 K/UL Final    ABS. EOSINOPHILS 03/21/2023 0.1  0.0 - 0.4 K/UL Final    ABS. BASOPHILS 03/21/2023 0.1  0.0 - 0.1 K/UL Final    ABS. IMM.  GRANS. 03/21/2023 0.0  0.00 - 0.04 K/UL Final    DF 03/21/2023 AUTOMATED    Final    Sodium 03/21/2023 138  136 - 145 mmol/L Final    Potassium 03/21/2023 4.2  3.5 - 5.1 mmol/L Final    SPECIMEN HEMOLYZED, RESULTS MAY BE AFFECTED    Chloride 03/21/2023 105  97 - 108 mmol/L Final    CO2 03/21/2023 29  21 - 32 mmol/L Final    Anion gap 03/21/2023 4 (A)  5 - 15 mmol/L Final    Glucose 03/21/2023 96  65 - 100 mg/dL Final    BUN 03/21/2023 11  6 - 20 MG/DL Final    Creatinine 03/21/2023 0.80  0.55 - 1.02 MG/DL Final    BUN/Creatinine ratio 03/21/2023 14  12 - 20   Final    eGFR 03/21/2023 >60  >60 ml/min/1.73m2 Final    Comment:      Pediatric calculator link: Robina.at. org/professionals/kdoqi/gfr_calculatorped       These results are not intended for use in patients <25years of age. eGFR results are calculated without a race factor using  the 2021 CKD-EPI equation. Careful clinical correlation is recommended, particularly when comparing to results calculated using previous equations. The CKD-EPI equation is less accurate in patients with extremes of muscle mass, extra-renal metabolism of creatinine, excessive creatine ingestion, or following therapy that affects renal tubular secretion. Calcium 03/21/2023 8.9  8.5 - 10.1 MG/DL Final    Bilirubin, total 03/21/2023 0.8  0.2 - 1.0 MG/DL Final    ALT (SGPT) 03/21/2023 23  12 - 78 U/L Final    AST (SGOT) 03/21/2023 26  15 - 37 U/L Final    SPECIMEN HEMOLYZED, RESULTS MAY BE AFFECTED    Alk. phosphatase 03/21/2023 52  45 - 117 U/L Final    Protein, total 03/21/2023 7.4  6.4 - 8.2 g/dL Final    Albumin 03/21/2023 3.3 (A)  3.5 - 5.0 g/dL Final    Globulin 03/21/2023 4.1 (A)  2.0 - 4.0 g/dL Final    A-G Ratio 03/21/2023 0.8 (A)  1.1 - 2.2   Final    INR 03/21/2023 1.0  0.9 - 1.1   Final    A single therapeutic range for Vit K antagonists may not be optimal for all indications - see June, 2008 issue of Chest, American College of Chest Physicians Evidence-Based Clinical Practice Guidelines, 8th Edition. Prothrombin time 03/21/2023 10.7  9.0 - 11.1 sec Final    HCG urine, QL 03/21/2023 Negative  NEG   Final    Glucose (POC) 03/21/2023 92  65 - 117 mg/dL Final    Comment: (NOTE)  The FDA has indicated that no capillary point of care blood glucose  monitoring systems are approved for use in \"critically ill\" patients,  however they have not defined this population.  The College of  American Pathologists has recommended that these devices should not  be used in cases such as severe hypotension, dehydration, shock, and  hyperglycemic-hyperosmolar state, amongst others. Venous or arterial  collection is the recommended specimen for testing these patients. Performed by 03/21/2023 Elias Spaulding (Tech)   Final       IMAGING RESULTS:  MRI BRAIN W WO CONT   Final Result   No acute infarct or intracranial hemorrhage. No significant brain parenchymal signal abnormality, mass or enhancing lesion. CTA CODE NEURO HEAD AND NECK W CONT   Final Result      CT Brain Perfusion:   No blood flow or volume deficits to suggest acute ischemia or tissue at ischemic   risk. CTA Head:   No large vessel occlusion or significant flow-limiting stenosis. CTA Neck:   No large vessel occlusion or significant flow-limiting stenosis. CT CODE NEURO PERF W CBF   Final Result      CT Brain Perfusion:   No blood flow or volume deficits to suggest acute ischemia or tissue at ischemic   risk. CTA Head:   No large vessel occlusion or significant flow-limiting stenosis. CTA Neck:   No large vessel occlusion or significant flow-limiting stenosis. CT CODE NEURO HEAD WO CONTRAST   Final Result      No acute process. MEDICATIONS GIVEN:  Medications   acetaminophen (TYLENOL) tablet 650 mg (has no administration in time range)     Or   acetaminophen (TYLENOL) solution 650 mg (has no administration in time range)     Or   acetaminophen (TYLENOL) suppository 650 mg (has no administration in time range)   aspirin chewable tablet 81 mg (has no administration in time range)   iopamidoL (ISOVUE-370) 370 mg iodine /mL (76 %) injection 150 mL (140 mL IntraVENous Given 3/21/23 0903)   sodium chloride 0.9 % bolus infusion 1,000 mL (0 mL IntraVENous IV Completed 3/21/23 1503)   gadoteridoL (PROHANCE) 279.3 mg/mL contrast solution 20 mL (20 mL IntraVENous Given 3/21/23 1216)       IMPRESSION:  1.  Right facial numbness        PLAN:  - Discharged by hospitalist after ED MRI and Neurology consultation    Liberty Gusman MD

## 2023-03-21 NOTE — CONSULTS
Full note to follow. MRI negative for acute process. CT, CTA without acute process. No e/o Bell's palsy, Meniere's or trigeminal neuralgia. ? occipital migraine as did have \"pressure behind her eye\". Regardless stroke work-up negative and n/o MS. Neurology to see in a few minutes and then pt will dc home.

## 2023-03-21 NOTE — ED NOTES
Signs and symptoms: right sided facial numbness   Code Stroke activation time: 8116  Provider at bedside time:  0835  VAN score: Negative  Last Known Well (Time): 2130  Blood Glucose Result/Time: 92   Blood Pressure: 144/86  Anticoagulants (List medications): no

## 2023-03-21 NOTE — CONSULTS
[facility]: [address], [address], [facility]  [address] [facility]  [place] [address]  [phone]    Name:   Richard Durham record #: [id number]  Admission Date: 3/21/2023     Who Consulted: Dr. Farhan Benitez    Reason for Consult:  TIA    HISTORY OF PRESENT ILLNESS:     This is a 28 y.o. female who is admitted for numbness of the right face. Ms. Vanessa Johnson reports that approximately one year ago she had and episode of RUE weakness and right facial droop for which she was seen at the [place] and told it was anxiety. This morning she awoke with right ear numbness that radiates along the V5 distribution, the symptoms lasted several hours and have since resolved. She denies any visual changes, facial drooping or slurred speech at the time of the event, denies a history of excessive miscarriage, family history of stroke. Upon arrival to the ED her Bp was 144/86, and the ED provider noted continued facial numbness. The Neurology Service is asked to evaluate for TIA vs Atypical Migraine. Neuro-imaging:     CT Head: No acute process. CTA/CTP Head and Neck: No large vessel occlusion or significant flow-limiting stenosis    EKG: normal sinus rhythm. Care Plan discussed with:  Patient x   Family    RN x   Care Manager    Consultant/Specialist:  x       Thank you for allowing the Neurology Service the pleasure of participating in the care of your patient. Assessment/Plan:     1.   Transient Neurological Symptoms:    Symptoms are not consistent with TIA, possible atypical migraine  No further neurological work up at this time  Follow up in our clinic in one month      Risk factors for stroke include:  Obesity, HLD, physical inactivity  Discussed with patient    Discussed signs/symptoms of stroke and when to call 911  Pt instructed to return to ED with BEFAST symptoms      Thank you for allowing the Neurology Service the pleasure of participating in the care of your patient. Review of Systems: 10 point ROS was performed. Pertinent positives listed in HPI. Negative ROS is as follows. Pt denies: angina, palpitations, paresthesias, weakness, vision loss, slurred speech, aphasia, confusion, fever, chills, falls, headache, diplopia, back pain, neck pain, prior episodes of vertigo, hallucinations, new medications or dosage changes. NEUROLOGICAL EXAM:     General:   Mental Status: Alert, no acute distress  Oriented to time, place and person. Fully attentive. No aphasia. Full fund of knowledge. Normal recent and remote memory. Cranial Nerves:   Visual Fields:  normal in all quadrants in both eyes. EOM: no nystagmus. Facial movements:  symmetric, no ptosis Facial sensation:  intact to LT on both sides. Hearing:  normal.       Language:  no dysarthria, no aphasia, normal fluency, normal repetition. Tongue: midline. Soft palate: not examined  SCMs: normal, symmetric. Reflexes:   LUExt: 2+/ 4                 RUExt: 2+/ 4  LLExt: 2+/4                  RLExt: 2+/ 4          Sensory:   LT and Temp intact in all extremities            Cerebellar:  No resting, no postural tremors, normal finger nose finger. No pronator drift                            Motor:           LUExt: 5/ 5               RUExt: 5/ 5                                              LLExt: 5/ 5                RLExt: 5/ 5        Gait:   Not tested              Allergies:   No Known Allergies    Outpatient Meds  No current facility-administered medications on file prior to encounter. Current Outpatient Medications on File Prior to Encounter   Medication Sig Dispense Refill    traMADol-acetaminophen (ULTRACET) 37.5-325 mg per tablet Take 1 Tab by mouth every six (6) hours as needed for Pain. Max Daily Amount: 4 Tabs. 20 Tab 0    meloxicam (MOBIC) 7.5 mg tablet Take 1 Tab by mouth daily.  14 Tab 0       Inpatient Meds    Current Facility-Administered Medications   Medication Dose Route Frequency Provider Last Rate Last Admin    sodium chloride 0.9 % bolus infusion 1,000 mL  1,000 mL IntraVENous ONCE Carlos Morley MD 2,000 mL/hr at 03/21/23 1416 1,000 mL at 03/21/23 1416    acetaminophen (TYLENOL) tablet 650 mg  650 mg Oral Q4H PRN Elaine Contreras MD        Or    acetaminophen (TYLENOL) solution 650 mg  650 mg Per NG tube Q4H PRN Elaine Contreras MD        Or    acetaminophen (TYLENOL) suppository 650 mg  650 mg Rectal Q4H PRN Odette Irving MD        [START ON 3/22/2023] aspirin chewable tablet 81 mg  81 mg Oral DAILY Odette Irving MD         Current Outpatient Medications   Medication Sig Dispense Refill    traMADol-acetaminophen (ULTRACET) 37.5-325 mg per tablet Take 1 Tab by mouth every six (6) hours as needed for Pain. Max Daily Amount: 4 Tabs. 20 Tab 0    meloxicam (MOBIC) 7.5 mg tablet Take 1 Tab by mouth daily. 14 Tab 0           Past Medical History:   Diagnosis Date    Anxiety 01/2014    Ovarian cyst     PTSD (post-traumatic stress disorder) 01/2013       No past surgical history on file. family history includes Alcohol abuse in her father; Anemia in her mother; Asthma in her sister; Bleeding Prob in her mother; Endometriosis in her mother; Headache in her mother; Hypertension in her mother; Liver Disease in her father; Migraines in her mother; Thyroid Disease in her mother. reports that she has never smoked. She has never used smokeless tobacco. She reports that she does not drink alcohol and does not use drugs.            Lab Results (last 24 hrs)  Recent Results (from the past 24 hour(s))   GLUCOSE, POC    Collection Time: 03/21/23  8:41 AM   Result Value Ref Range    Glucose (POC) 92 65 - 117 mg/dL    Performed by Chuck Alegria (Tech)    CBC WITH AUTOMATED DIFF    Collection Time: 03/21/23 10:21 AM   Result Value Ref Range    WBC 6.6 3.6 - 11.0 K/uL    RBC 4.63 3.80 - 5.20 M/uL    HGB 13.3 11.5 - 16.0 g/dL    HCT 40.2 35.0 - 47.0 %    MCV 86.8 80.0 - 99.0 FL    MCH 28.7 26.0 - 34.0 PG    MCHC 33.1 30.0 - 36.5 g/dL    RDW 12.7 11.5 - 14.5 %    PLATELET 716 935 - 305 K/uL    MPV 10.7 8.9 - 12.9 FL    NRBC 0.0 0  WBC    ABSOLUTE NRBC 0.00 0.00 - 0.01 K/uL    NEUTROPHILS 58 32 - 75 %    LYMPHOCYTES 32 12 - 49 %    MONOCYTES 8 5 - 13 %    EOSINOPHILS 1 0 - 7 %    BASOPHILS 1 0 - 1 %    IMMATURE GRANULOCYTES 0 0.0 - 0.5 %    ABS. NEUTROPHILS 3.8 1.8 - 8.0 K/UL    ABS. LYMPHOCYTES 2.1 0.8 - 3.5 K/UL    ABS. MONOCYTES 0.5 0.0 - 1.0 K/UL    ABS. EOSINOPHILS 0.1 0.0 - 0.4 K/UL    ABS. BASOPHILS 0.1 0.0 - 0.1 K/UL    ABS. IMM. GRANS. 0.0 0.00 - 0.04 K/UL    DF AUTOMATED     METABOLIC PANEL, COMPREHENSIVE    Collection Time: 03/21/23 10:21 AM   Result Value Ref Range    Sodium 138 136 - 145 mmol/L    Potassium 4.2 3.5 - 5.1 mmol/L    Chloride 105 97 - 108 mmol/L    CO2 29 21 - 32 mmol/L    Anion gap 4 (L) 5 - 15 mmol/L    Glucose 96 65 - 100 mg/dL    BUN 11 6 - 20 MG/DL    Creatinine 0.80 0.55 - 1.02 MG/DL    BUN/Creatinine ratio 14 12 - 20      eGFR >60 >60 ml/min/1.73m2    Calcium 8.9 8.5 - 10.1 MG/DL    Bilirubin, total 0.8 0.2 - 1.0 MG/DL    ALT (SGPT) 23 12 - 78 U/L    AST (SGOT) 26 15 - 37 U/L    Alk.  phosphatase 52 45 - 117 U/L    Protein, total 7.4 6.4 - 8.2 g/dL    Albumin 3.3 (L) 3.5 - 5.0 g/dL    Globulin 4.1 (H) 2.0 - 4.0 g/dL    A-G Ratio 0.8 (L) 1.1 - 2.2     PROTHROMBIN TIME + INR    Collection Time: 03/21/23 10:21 AM   Result Value Ref Range    INR 1.0 0.9 - 1.1      Prothrombin time 10.7 9.0 - 11.1 sec   HCG URINE, QL    Collection Time: 03/21/23 10:21 AM   Result Value Ref Range    HCG urine, QL Negative NEG     EKG, 12 LEAD, INITIAL    Collection Time: 03/21/23  2:02 PM   Result Value Ref Range    Ventricular Rate 64 BPM    Atrial Rate 64 BPM    P-R Interval 136 ms    QRS Duration 84 ms    Q-T Interval 438 ms    QTC Calculation (Bezet) 451 ms    Calculated P Axis 50 degrees    Calculated R Axis 59 degrees    Calculated T Axis 5 degrees Diagnosis       Normal sinus rhythm with sinus arrhythmia  Normal ECG  When compared with ECG of 02-SEP-2016 22:14,  No significant change was found

## 2023-03-21 NOTE — LETTER
[address]  [facility] EMERGENCY DEPT  [address] [id number]  [phone]    Work/School Note    Date: 3/21/2023    To Whom It May concern:    Ozzie Vidal was seen and treated today in the emergency room by the following provider(s):  Attending Provider: Janice Alvarado MD.      Ozzie Vidal was seen in the emergency department today 3/21/2023    Sincerely,

## 2023-03-21 NOTE — ED NOTES
Stroke Education provided to patient and the following topics were discussed    1. Patients personal risk factors for stroke are none    2. Warning signs of Stroke:        * Sudden numbness or weakness of the face, arm or leg, especially on one side of          The body            * Sudden confusion, trouble speaking or understanding        * Sudden trouble seeing in one or both eyes        * Sudden trouble walking, dizziness, loss of balance or coordination        * Sudden severe headache with no known cause      3. Importance of activation Emergency Medical Services ( 9-1-1 ) immediately if experience any warning signs of stroke. 4. Be sure and schedule a follow-up appointment with your primary care doctor or any specialists as instructed. 5. You must take medicine every day to treat your risk factors for stroke. Be sure to take your medicines exactly as your doctor tells you: no more, no less. Know what your medicines are for , what they do. Anti-thrombotics /anticoagulants can help prevent strokes. You are taking the following medicine(s)  none     6. Smoking and second-hand smoke greatly increase your risk of stroke, cardiovascular disease and death. Smoking history never    7. Information provided was [facility] Stroke Education Binder    8. Documentation of teaching completed in Patient Education Activity and on Care Plan with teaching response noted?   yes

## 2023-03-22 LAB
CHOLEST SERPL-MCNC: 172 MG/DL
HDLC SERPL-MCNC: 44 MG/DL
HDLC SERPL: 3.9 (ref 0–5)
LDLC SERPL CALC-MCNC: 109 MG/DL (ref 0–100)
TRIGL SERPL-MCNC: 95 MG/DL (ref ?–150)
VLDLC SERPL CALC-MCNC: 19 MG/DL

## 2023-04-12 ENCOUNTER — OFFICE VISIT (OUTPATIENT)
Dept: FAMILY MEDICINE CLINIC | Age: 33
End: 2023-04-12
Payer: MEDICAID

## 2023-04-12 VITALS
WEIGHT: 244 LBS | BODY MASS INDEX: 40.65 KG/M2 | RESPIRATION RATE: 16 BRPM | HEIGHT: 65 IN | TEMPERATURE: 99.2 F | HEART RATE: 81 BPM | SYSTOLIC BLOOD PRESSURE: 112 MMHG | OXYGEN SATURATION: 97 % | DIASTOLIC BLOOD PRESSURE: 68 MMHG

## 2023-04-12 DIAGNOSIS — R20.0 RIGHT ARM NUMBNESS: ICD-10-CM

## 2023-04-12 DIAGNOSIS — Z00.00 ENCOUNTER FOR MEDICAL EXAMINATION TO ESTABLISH CARE: Primary | ICD-10-CM

## 2023-04-12 DIAGNOSIS — E66.01 MORBID OBESITY (HCC): ICD-10-CM

## 2023-04-12 PROCEDURE — 99203 OFFICE O/P NEW LOW 30 MIN: CPT | Performed by: FAMILY MEDICINE

## 2023-04-12 RX ORDER — ASPIRIN 81 MG/1
TABLET ORAL DAILY
COMMUNITY

## 2023-04-12 NOTE — PROGRESS NOTES
Peter Hernandez is a 28 y.o. female    Chief Complaint   Patient presents with    Establish Care     Patient is coming in for right arm numbness and heaviness. In march or february she woke up one day in the morning and she could not move her arm at all. She went to the hospital and everything was normal. A couple of weeks after she started feeling numbness. She has been having discomfort. . Patient was seen at the [place] but is looking for a second opinion. She has an appointment with neurology next week. No other concerns. 1. Have you been to the ER, urgent care clinic since your last visit? Hospitalized since your last visit? No    2. Have you seen or consulted any other health care providers outside of the [address] [place] [address] since your last visit? Include any pap smears or colon screening. No      Visit Vitals  /68 (BP 1 Location: Right upper arm, BP Patient Position: Sitting)   Pulse 81   Temp 99.2 °F (37.3 °C) (Oral)   Resp 16   Ht 5' 5\" (1.651 m)   Wt 244 lb (110.7 kg)   SpO2 97%   BMI 40.60 kg/m²           Health Maintenance Due   Topic Date Due    Depression Screen  Never done    COVID-19 Vaccine (1) Never done    Varicella Vaccine (1 of 2 - 2-dose childhood series) Never done    DTaP/Tdap/Td series (1 - Tdap) Never done    Cervical cancer screen  08/18/2020         Medication Reconciliation completed, changes noted.   Please  Update medication list.

## 2023-04-12 NOTE — PROGRESS NOTES
[facility] [facility] Office Visit     Assessment/ Plan: Velma Brock is a 28 y.o. female presenting for:    ***    *** minutes were spent on the day of this encounter both with the patient and in related activities including chart review, care coordination and counseling. Patient instructions were discussed and/or provided in the AVS. The patient understands and agrees to the plan. RETURN TO CARE: ***  Future Appointments   Date Time Provider Kian Roberts   4/19/2023 10:00 AM Any Yusuf NP [facility]     Subjective:  Chief Complaint   Patient presents with    Establish Care     Patient is coming in for right arm numbness and heaviness. In march or february she woke up one day in the morning and she could not move her arm at all. She went to the hospital and everything was normal. A couple of weeks after she started feeling numbness. She has been having discomfort. . Patient was seen at the [place] but is looking for a second opinion. She has an appointment with neurology next week. No other concerns. HPI: Jerry Byrd is a 28 y.o. female with PMH of LSIL, gall stones here for establishing care.      Right Arm/Shoulder Pain  - seen at [place] and wants another opinion - told anxiety, maybe TIA - was told to take ASA daily  went to see nutrition   - did HR monitor - no afib, nothing super abnormal  had palpitations at the time   - feels heavy   - seen in the ER for right facial numbness - has had in the past and was admitted to [facility] in [facility], reports normal MRI at that time  discussed with tele neuro - had MRI which was normal, thought to be atypical migraine   - recurring issue heavy moments - can still move but harder, feels disconnected   - numbness starts at ear, goes up/out towards nose, feels like maybe moving   - episodes last at least 5 minutes   - does goes days without it and then when happens recurs for a few days in a row   - gets up walks around, drinks water, takes ASA   - when happened at night - scared, heavy - stood up to make sure not a stroke  - takes ASA   - no history of strokes in family  - told cholesterol slightly more elevated at [place]  - when anxiety - feels both sides, starts in feet and goes up  can make it go away with deep breathing  - has never had a migraine, no vision changes  will get pressure behind right eye   - first episode October 2021 January 2022   - neck hump   - can get numbness/shooting pain - had xrays and nothing obvious   - nothing positional     Obesity  - has lost about 50lbs in last year or so   - avoids salt, not much fast food  - not much soda, avoids red meat  - eats fish, chicken, vegetables  doesn't have as many eggs   - has been checked for sleep apnea and normal     History  - [employer]  - mirena IUD removed last November   - pap done 6 months ago - [place]     I have reviewed the patients problem list, current medications, allergies, family, medical and social history. I have updated them as needed. Review of Systems  See HPI.     Objective:  Visit Vitals  /68 (BP 1 Location: Right upper arm, BP Patient Position: Sitting)   Pulse 81   Temp 99.2 °F (37.3 °C) (Oral)   Resp 16   Ht 5' 5\" (1.651 m)   Wt 244 lb (110.7 kg)   SpO2 97%   BMI 40.60 kg/m²     Physical Exam    [address] [place] [address]

## 2023-04-19 PROBLEM — E66.01 MORBID OBESITY (HCC): Status: ACTIVE | Noted: 2023-04-19

## 2023-05-23 ENCOUNTER — TELEPHONE (OUTPATIENT)
Age: 33
End: 2023-05-23

## 2023-05-23 RX ORDER — ASPIRIN 81 MG/1
TABLET ORAL DAILY
COMMUNITY

## 2023-05-23 NOTE — TELEPHONE ENCOUNTER
Called to notify the patient that Dr. Shelton Ferreira will be out of clinic tomorrow, and that her appointment at 3:20 pm will need to be rescheduled. Advised Ms. Jasmin Patel to call us back at her earliest convenience to reschedule.

## 2023-06-14 PROBLEM — M54.2 CERVICALGIA: Status: ACTIVE | Noted: 2023-06-14

## 2023-06-14 PROBLEM — F43.12 CHRONIC POST-TRAUMATIC STRESS DISORDER: Status: ACTIVE | Noted: 2023-06-14

## 2023-06-14 PROBLEM — R20.2 PARESTHESIA: Status: ACTIVE | Noted: 2023-06-14

## 2023-07-25 ENCOUNTER — HOSPITAL ENCOUNTER (OUTPATIENT)
Facility: HOSPITAL | Age: 33
Discharge: HOME | End: 2023-07-28
Attending: FAMILY MEDICINE
Payer: MEDICAID

## 2023-07-25 DIAGNOSIS — R20.0 FACIAL NUMBNESS: ICD-10-CM

## 2023-07-25 DIAGNOSIS — R51.9 NONINTRACTABLE HEADACHE, UNSPECIFIED CHRONICITY PATTERN, UNSPECIFIED HEADACHE TYPE: ICD-10-CM

## 2023-07-25 PROCEDURE — 70544 MR ANGIOGRAPHY HEAD W/O DYE: CPT

## 2023-08-04 ENCOUNTER — OFFICE VISIT (OUTPATIENT)
Age: 33
End: 2023-08-04

## 2023-08-04 VITALS
HEIGHT: 65 IN | BODY MASS INDEX: 40.65 KG/M2 | SYSTOLIC BLOOD PRESSURE: 114 MMHG | DIASTOLIC BLOOD PRESSURE: 72 MMHG | HEART RATE: 78 BPM | RESPIRATION RATE: 16 BRPM | OXYGEN SATURATION: 99 % | WEIGHT: 244 LBS

## 2023-08-04 DIAGNOSIS — R20.0 NUMBNESS AND TINGLING OF RIGHT FACE: Primary | ICD-10-CM

## 2023-08-04 DIAGNOSIS — R20.2 NUMBNESS AND TINGLING OF RIGHT FACE: Primary | ICD-10-CM

## 2023-08-04 DIAGNOSIS — R53.1 RIGHT SIDED WEAKNESS: ICD-10-CM

## 2023-08-04 PROCEDURE — 99215 OFFICE O/P EST HI 40 MIN: CPT

## 2023-08-04 RX ORDER — AMITRIPTYLINE HYDROCHLORIDE 10 MG/1
10 TABLET, FILM COATED ORAL NIGHTLY
Qty: 90 TABLET | Refills: 1 | Status: SHIPPED | OUTPATIENT
Start: 2023-08-04

## 2023-08-04 ASSESSMENT — PATIENT HEALTH QUESTIONNAIRE - PHQ9
SUM OF ALL RESPONSES TO PHQ QUESTIONS 1-9: 0
SUM OF ALL RESPONSES TO PHQ QUESTIONS 1-9: 0
2. FEELING DOWN, DEPRESSED OR HOPELESS: 0
SUM OF ALL RESPONSES TO PHQ QUESTIONS 1-9: 0
SUM OF ALL RESPONSES TO PHQ9 QUESTIONS 1 & 2: 0
SUM OF ALL RESPONSES TO PHQ QUESTIONS 1-9: 0
1. LITTLE INTEREST OR PLEASURE IN DOING THINGS: 0

## 2023-08-04 NOTE — PROGRESS NOTES
Chief Complaint   Patient presents with    Follow-up     Facial numbness. Started in 2021. Reporting some paralysis on right side (arm)       HPI    Mrs Delfin Gonzales is a 28year old female here for hospital follow up. She was seen inpatient by [facility] NP on 3/21/23 for RUE weakness and right facial droop that she was seen at the [facility] and was told it was anxiety. The next day the symptoms were worse and she came to the ED. TIA workup was negative. She was given a migraine cocktail and felt better. She is here today to follow up. She is reporting that her symptoms are still lingering. She describes it as a constant heaviness on right side face, especially when she tilts her head down. The numbness starts on the right side of her face by her ear and moves more central into her face but never crosses to the left side. The heaviness comes and goes, but tingling can last longer. Not affected by cold. Her arm and leg off and on get weak and numb. Denies pain at all. Denies neck or back pain. Review of Systems   Neurological:  Positive for weakness and numbness.        Past Medical History:   Diagnosis Date    Anxiety 01/2014    Ovarian cyst     PTSD (post-traumatic stress disorder) 01/2013     Family History   Problem Relation Age of Onset    Endometriosis Mother     Alcohol Abuse Father     Liver Disease Father         cirrhosis d/t drinking; hepatitis B     Thyroid Disease Mother     Migraines Mother     Hypertension Mother     Headache Mother         due to anemia    Asthma Sister     Anemia Mother     Bleeding Prob Mother         Anemia     Social History     Socioeconomic History    Marital status:      Spouse name: Not on file    Number of children: Not on file    Years of education: Not on file    Highest education level: Not on file   Occupational History    Not on file   Tobacco Use    Smoking status: Never    Smokeless tobacco: Never   Vaping Use    Vaping Use: Never used   Substance and

## 2023-08-04 NOTE — PROGRESS NOTES
Chief Complaint   Patient presents with    Follow-up     Facial numbness. Started in 2021.   Reporting some paralysis on right side (arm)      Vitals:    08/04/23 1422   BP: 114/72   Pulse: 78   Resp: 16   SpO2: 99%

## 2023-09-24 SDOH — ECONOMIC STABILITY: INCOME INSECURITY: HOW HARD IS IT FOR YOU TO PAY FOR THE VERY BASICS LIKE FOOD, HOUSING, MEDICAL CARE, AND HEATING?: PATIENT DECLINED

## 2023-09-24 SDOH — ECONOMIC STABILITY: HOUSING INSECURITY
IN THE LAST 12 MONTHS, WAS THERE A TIME WHEN YOU DID NOT HAVE A STEADY PLACE TO SLEEP OR SLEPT IN A SHELTER (INCLUDING NOW)?: NO

## 2023-09-24 SDOH — ECONOMIC STABILITY: FOOD INSECURITY: WITHIN THE PAST 12 MONTHS, YOU WORRIED THAT YOUR FOOD WOULD RUN OUT BEFORE YOU GOT MONEY TO BUY MORE.: SOMETIMES TRUE

## 2023-09-24 SDOH — ECONOMIC STABILITY: FOOD INSECURITY: WITHIN THE PAST 12 MONTHS, THE FOOD YOU BOUGHT JUST DIDN'T LAST AND YOU DIDN'T HAVE MONEY TO GET MORE.: SOMETIMES TRUE

## 2023-09-24 SDOH — ECONOMIC STABILITY: TRANSPORTATION INSECURITY
IN THE PAST 12 MONTHS, HAS LACK OF TRANSPORTATION KEPT YOU FROM MEETINGS, WORK, OR FROM GETTING THINGS NEEDED FOR DAILY LIVING?: NO

## 2023-09-27 ENCOUNTER — OFFICE VISIT (OUTPATIENT)
Age: 33
End: 2023-09-27
Payer: COMMERCIAL

## 2023-09-27 VITALS
HEART RATE: 68 BPM | OXYGEN SATURATION: 95 % | DIASTOLIC BLOOD PRESSURE: 75 MMHG | RESPIRATION RATE: 16 BRPM | BODY MASS INDEX: 40.82 KG/M2 | TEMPERATURE: 98.8 F | HEIGHT: 65 IN | WEIGHT: 245 LBS | SYSTOLIC BLOOD PRESSURE: 119 MMHG

## 2023-09-27 DIAGNOSIS — H93.13 TINNITUS OF BOTH EARS: ICD-10-CM

## 2023-09-27 DIAGNOSIS — R20.2 PARESTHESIA: Primary | ICD-10-CM

## 2023-09-27 PROCEDURE — 99213 OFFICE O/P EST LOW 20 MIN: CPT | Performed by: FAMILY MEDICINE

## 2023-09-27 NOTE — PROGRESS NOTES
[address] [facility] Medicine Office Visit     Assessment/ Plan: Mary Faye is a 35 y.o. female presenting for:    Right Sided Paraesthesias  Headaches  Tinnitus  - Chronic. Has seen neurology, no clear etiology identified though I do have some suspicion for trigeminal neuralgia based on pattern of symptoms of face. Reviewed prior head imaging, labs. Also possibly TMJ/nighttime bruxism - has dental visit follow-up scheduled. Has had some improvement with amitriptyline. Will refer to ENT for audiology/ENT eval as well. Does have history of time in [place]. Information provided for local OMT provider per patient request - cervical and trapezius tension. 15 minutes were spent on the day of this encounter both with the patient and in related activities including chart review, care coordination and counseling. Patient instructions were discussed and/or provided in the AVS. The patient understands and agrees to the plan. RETURN TO CARE: after ENT visit   No future appointments. Subjective:  Chief Complaint   Patient presents with    Numbness     Follow up from neurology. HPI:   Mary Faye is a 35 y.o. female with PMH of PTSD, obesity, paresthesias here for follow-up. 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  Eye doctor - within the last couple months, did optic and nerve scan and was okay, no papilledema, some mild dryness    - trigeminal neuralgia - not told that, mostly V1 branch distribution   - maybe anxious  - doing flonase - has

## 2024-02-01 DIAGNOSIS — R20.2 NUMBNESS AND TINGLING OF RIGHT FACE: ICD-10-CM

## 2024-02-01 DIAGNOSIS — R20.0 NUMBNESS AND TINGLING OF RIGHT FACE: ICD-10-CM

## 2024-02-01 DIAGNOSIS — R53.1 RIGHT SIDED WEAKNESS: ICD-10-CM

## 2024-02-02 RX ORDER — AMITRIPTYLINE HYDROCHLORIDE 10 MG/1
10 TABLET, FILM COATED ORAL NIGHTLY
Qty: 90 TABLET | Refills: 0 | OUTPATIENT
Start: 2024-02-02

## 2024-02-03 DIAGNOSIS — R53.1 RIGHT SIDED WEAKNESS: ICD-10-CM

## 2024-02-03 DIAGNOSIS — R20.0 NUMBNESS AND TINGLING OF RIGHT FACE: ICD-10-CM

## 2024-02-03 DIAGNOSIS — R20.2 NUMBNESS AND TINGLING OF RIGHT FACE: ICD-10-CM

## 2024-02-08 ENCOUNTER — TELEPHONE (OUTPATIENT)
Age: 34
End: 2024-02-08

## 2024-02-08 DIAGNOSIS — R20.2 NUMBNESS AND TINGLING OF RIGHT FACE: ICD-10-CM

## 2024-02-08 DIAGNOSIS — R20.0 NUMBNESS AND TINGLING OF RIGHT FACE: ICD-10-CM

## 2024-02-08 DIAGNOSIS — R53.1 RIGHT SIDED WEAKNESS: ICD-10-CM

## 2024-02-08 RX ORDER — AMITRIPTYLINE HYDROCHLORIDE 10 MG/1
10 TABLET, FILM COATED ORAL NIGHTLY
Qty: 90 TABLET | Refills: 1 | Status: SHIPPED | OUTPATIENT
Start: 2024-02-08

## 2024-02-08 NOTE — TELEPHONE ENCOUNTER
Patient is calling to request a refill of amitriptyline to be sent to the [facility] on file.    Patient has been scheduled for a follow up appointment for later this month.

## 2024-02-21 RX ORDER — AMITRIPTYLINE HYDROCHLORIDE 10 MG/1
10 TABLET, FILM COATED ORAL NIGHTLY
Qty: 90 TABLET | Refills: 0 | OUTPATIENT
Start: 2024-02-21

## 2024-03-28 ENCOUNTER — OFFICE VISIT (OUTPATIENT)
Age: 34
End: 2024-03-28
Payer: COMMERCIAL

## 2024-03-28 VITALS
SYSTOLIC BLOOD PRESSURE: 128 MMHG | DIASTOLIC BLOOD PRESSURE: 75 MMHG | HEART RATE: 70 BPM | BODY MASS INDEX: 40.82 KG/M2 | RESPIRATION RATE: 17 BRPM | OXYGEN SATURATION: 98 % | HEIGHT: 65 IN | WEIGHT: 245 LBS

## 2024-03-28 DIAGNOSIS — R53.1 RIGHT SIDED WEAKNESS: ICD-10-CM

## 2024-03-28 DIAGNOSIS — R20.0 NUMBNESS AND TINGLING OF RIGHT FACE: Primary | ICD-10-CM

## 2024-03-28 DIAGNOSIS — R20.2 NUMBNESS AND TINGLING OF RIGHT FACE: Primary | ICD-10-CM

## 2024-03-28 DIAGNOSIS — J34.89 SINUS PRESSURE: ICD-10-CM

## 2024-03-28 PROCEDURE — 99214 OFFICE O/P EST MOD 30 MIN: CPT

## 2024-03-28 RX ORDER — AMITRIPTYLINE HYDROCHLORIDE 10 MG/1
20 TABLET, FILM COATED ORAL NIGHTLY
Qty: 180 TABLET | Refills: 1 | Status: SHIPPED | OUTPATIENT
Start: 2024-03-28

## 2024-03-28 ASSESSMENT — PATIENT HEALTH QUESTIONNAIRE - PHQ9
2. FEELING DOWN, DEPRESSED OR HOPELESS: NOT AT ALL
SUM OF ALL RESPONSES TO PHQ QUESTIONS 1-9: 0
SUM OF ALL RESPONSES TO PHQ QUESTIONS 1-9: 0
1. LITTLE INTEREST OR PLEASURE IN DOING THINGS: NOT AT ALL
SUM OF ALL RESPONSES TO PHQ QUESTIONS 1-9: 0
SUM OF ALL RESPONSES TO PHQ9 QUESTIONS 1 & 2: 0
SUM OF ALL RESPONSES TO PHQ QUESTIONS 1-9: 0

## 2024-03-28 ASSESSMENT — ENCOUNTER SYMPTOMS
SINUS PRESSURE: 1
SINUS PAIN: 1
PHOTOPHOBIA: 1

## 2024-03-28 NOTE — PROGRESS NOTES
Chief Complaint   Patient presents with    Follow-up     numbness      Vitals:    03/28/24 1430   BP: 128/75   Pulse: 70   Resp: 17   SpO2: 98%      
apices are clear.    No acute fracture or aggressive osseous lesion. Mild reversal of normal cervical  lordosis without significant vertebral listhesis, nonspecific.    Impression  CT Brain Perfusion:  No blood flow or volume deficits to suggest acute ischemia or tissue at ischemic  risk.    CTA Head:  No large vessel occlusion or significant flow-limiting stenosis.    CTA Neck:  No large vessel occlusion or significant flow-limiting stenosis.      CTA HEAD NECK W CONTRAST 03/21/2023    Narrative  EXAM:  CTA CODE NEURO HEAD AND NECK W CONT    INDICATION:   Code Stroke    COMPARISON:  None.    CONTRAST:  150 mL of Isovue-370.    TECHNIQUE:  Unenhanced  images were obtained to localize the volume for  acquisition.  Multislice helical axial CT angiography was performed from the  aortic arch to the top of the head during uneventful rapid bolus intravenous  contrast administration.   Coronal and sagittal reformations and 3D/MIP  post  processing were performed.  CT brain perfusion was performed with generation of hemodynamic maps of multiple  parameters, including cerebral blood flow, cerebral blood volume, and MTT (mean  transit time).  CT dose reduction was achieved through use of a standardized protocol tailored  for this examination and automatic exposure control for dose modulation. This  study was analyzed by the [employer] algorithm.    FINDINGS:    CT Brain Perfusion:  Brain parenchymal perfusion study demonstrates no suggestion of acute infarct.  No regional areas of elevated Tmax, decreased cerebral blood flow or blood  volume.  rCBF < 30% = 0 cc.  Tmax > 6 seconds = 0 cc.    CTA Head:  There is no evidence of large vessel occlusion or flow-limiting stenosis of the  intracranial internal carotid, anterior cerebral, and middle cerebral arteries.  Calcification of the bilateral carotid siphons without significant stenosis. The  anterior communicating artery is patent.    There is no evidence of large vessel

## 2024-08-23 ENCOUNTER — OFFICE VISIT (OUTPATIENT)
Age: 34
End: 2024-08-23

## 2024-08-23 VITALS
BODY MASS INDEX: 46.78 KG/M2 | TEMPERATURE: 98.6 F | HEART RATE: 81 BPM | HEIGHT: 64 IN | SYSTOLIC BLOOD PRESSURE: 133 MMHG | OXYGEN SATURATION: 94 % | RESPIRATION RATE: 14 BRPM | WEIGHT: 274 LBS | DIASTOLIC BLOOD PRESSURE: 80 MMHG

## 2024-08-23 DIAGNOSIS — R05.1 ACUTE COUGH: Primary | ICD-10-CM

## 2024-08-27 ENCOUNTER — OFFICE VISIT (OUTPATIENT)
Age: 34
End: 2024-08-27

## 2024-08-27 VITALS
DIASTOLIC BLOOD PRESSURE: 87 MMHG | HEART RATE: 88 BPM | WEIGHT: 274 LBS | HEIGHT: 64 IN | BODY MASS INDEX: 46.78 KG/M2 | SYSTOLIC BLOOD PRESSURE: 127 MMHG | OXYGEN SATURATION: 94 % | RESPIRATION RATE: 16 BRPM | TEMPERATURE: 99.3 F

## 2024-08-27 DIAGNOSIS — J02.0 STREP PHARYNGITIS: Primary | ICD-10-CM

## 2024-08-27 DIAGNOSIS — J02.9 SORE THROAT: ICD-10-CM

## 2024-08-27 LAB
Lab: NORMAL
PERFORMING INSTRUMENT: NORMAL
QC PASS/FAIL: NORMAL
SARS-COV-2, POC: NORMAL
STREP PYOGENES DNA, POC: POSITIVE
VALID INTERNAL CONTROL, POC: ABNORMAL

## 2024-08-27 RX ORDER — AMOXICILLIN 500 MG/1
500 CAPSULE ORAL 2 TIMES DAILY
Qty: 20 CAPSULE | Refills: 0 | Status: SHIPPED | OUTPATIENT
Start: 2024-08-27 | End: 2024-09-06

## 2024-08-27 ASSESSMENT — ENCOUNTER SYMPTOMS: COUGH: 1

## 2024-08-27 NOTE — PROGRESS NOTES
Vy Galaviz (:  1990) is a 34 y.o. female,Established patient, here for evaluation of the following chief complaint(s):  Cough (Pt c/o dry cough but sometimes coughing up a little bit of phlegm. Also having some laryngitis, and stomach upset loose stool but not diarrhea. Cough has been going on for a while. But fever and other sx the past 2-3 days. )      Assessment & Plan :  Visit Diagnoses and Associated Orders       Strep pharyngitis    -  Primary    amoxicillin (AMOXIL) 500 MG capsule [451]           Sore throat        POCT COVID-19, Antigen [CON806 Custom]      AMB POC STREP GO A DIRECT, DNA PROBE [70509 CPT(R)]                 Strep pharyngitis -  -Start amoxicillin 500 mg twice daily for 10 days  -Make sure to take to completion  -Can try throat lozenges for throat discomfort  -Salt water gargles for throat discomfort (1/4 tsp of salt in 8 oz of water)  -Over the counter pain relievers for aches/fever  -Drink plenty fluids  -Call or return to clinic if no improvement or any worsening         Subjective :    Cough      HPI:   34 y.o. female presents with symptoms of hoarseness/scratchy throat and loose stools last 2-3 days. Patient states she has had an ongoing cough from a cold over a month ago but states that is not associated with this. States no abdominal pain, not necessarily diarrhea but stools are more loose than normal. Running a low grade temp. Mild coryzal symptoms. Cough.    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         Vitals:    24 1539   BP: 127/87   Site: Left Upper Arm   Position: Sitting   Cuff Size: Large Adult   Pulse: 88   Resp: 16   Temp: 99.3 °F (37.4 °C)   TempSrc: Oral   SpO2: 94%   Weight: 124.3 kg (274 lb)   Height: 1.626 m (5' 4\")          Objective   Physical Exam  Constitutional:       General: She is not in acute distress.     Appearance: Normal appearance. She is not ill-appearing or toxic-appearing.   HENT:      Head: Normocephalic and  atraumatic.      Right Ear: Tympanic membrane, ear canal and external ear normal.      Left Ear: Tympanic membrane, ear canal and external ear normal.      Nose: Nose normal.      Mouth/Throat:      Mouth: Mucous membranes are moist.      Pharynx: Posterior oropharyngeal erythema present. No oropharyngeal exudate.   Eyes:      General:         Right eye: No discharge.         Left eye: No discharge.      Conjunctiva/sclera: Conjunctivae normal.   Cardiovascular:      Rate and Rhythm: Normal rate and regular rhythm.      Heart sounds: Normal heart sounds. No murmur heard.     No friction rub. No gallop.   Pulmonary:      Effort: Pulmonary effort is normal. No respiratory distress.      Breath sounds: Normal breath sounds. No wheezing, rhonchi or rales.      Comments: Coughing on exam, dry. No adventitious sounds.  Abdominal:      General: Abdomen is flat. Bowel sounds are normal. There is no distension.      Palpations: Abdomen is soft.      Tenderness: There is no abdominal tenderness. There is no guarding or rebound.   Musculoskeletal:      Cervical back: Neck supple. No tenderness.   Lymphadenopathy:      Cervical: No cervical adenopathy.   Skin:     General: Skin is warm and dry.      Findings: No erythema or rash.   Neurological:      General: No focal deficit present.      Mental Status: She is alert and oriented to person, place, and time.   Psychiatric:         Mood and Affect: Mood normal.         Behavior: Behavior normal.         Thought Content: Thought content normal.         Judgment: Judgment normal.              An electronic signature was used to authenticate this note.    --Saundra Jason PA-C             93.4

## 2024-08-27 NOTE — PATIENT INSTRUCTIONS
Strep pharyngitis -  -Start amoxicillin 500 mg twice daily for 10 days  -Make sure to take to completion  -Can try throat lozenges for throat discomfort  -Salt water gargles for throat discomfort (1/4 tsp of salt in 8 oz of water)  -Over the counter pain relievers for aches/fever  -Drink plenty fluids  -Call or return to clinic if no improvement or any worsening

## 2024-09-16 ENCOUNTER — OFFICE VISIT (OUTPATIENT)
Age: 34
End: 2024-09-16

## 2024-09-16 VITALS
HEIGHT: 64 IN | OXYGEN SATURATION: 98 % | RESPIRATION RATE: 16 BRPM | BODY MASS INDEX: 46.71 KG/M2 | WEIGHT: 273.59 LBS | SYSTOLIC BLOOD PRESSURE: 127 MMHG | TEMPERATURE: 99.1 F | DIASTOLIC BLOOD PRESSURE: 85 MMHG | HEART RATE: 79 BPM

## 2024-09-16 DIAGNOSIS — H66.003 NON-RECURRENT ACUTE SUPPURATIVE OTITIS MEDIA OF BOTH EARS WITHOUT SPONTANEOUS RUPTURE OF TYMPANIC MEMBRANES: Primary | ICD-10-CM

## 2024-09-16 DIAGNOSIS — J02.0 STREP PHARYNGITIS: ICD-10-CM

## 2024-09-16 DIAGNOSIS — J04.0 LARYNGITIS: ICD-10-CM

## 2024-09-16 ASSESSMENT — ENCOUNTER SYMPTOMS: COUGH: 1

## 2024-09-20 ENCOUNTER — TELEPHONE (OUTPATIENT)
Age: 34
End: 2024-09-20

## 2024-09-20 ENCOUNTER — OFFICE VISIT (OUTPATIENT)
Age: 34
End: 2024-09-20

## 2024-09-20 VITALS
SYSTOLIC BLOOD PRESSURE: 128 MMHG | HEIGHT: 64 IN | HEART RATE: 80 BPM | RESPIRATION RATE: 14 BRPM | OXYGEN SATURATION: 95 % | WEIGHT: 271 LBS | DIASTOLIC BLOOD PRESSURE: 84 MMHG | TEMPERATURE: 98.1 F | BODY MASS INDEX: 46.26 KG/M2

## 2024-09-20 DIAGNOSIS — J02.9 SORE THROAT: Primary | ICD-10-CM

## 2024-09-20 LAB
INFLUENZA A ANTIGEN, POC: NEGATIVE
INFLUENZA B ANTIGEN, POC: NEGATIVE
Lab: NORMAL
PERFORMING INSTRUMENT: NORMAL
QC PASS/FAIL: NORMAL
SARS-COV-2, POC: NORMAL
STREP PYOGENES DNA, POC: NEGATIVE
VALID INTERNAL CONTROL, POC: NORMAL

## 2024-09-22 LAB
BACTERIA SPEC CULT: NORMAL
SERVICE CMNT-IMP: NORMAL

## 2024-09-23 LAB
BACTERIA SPEC CULT: NORMAL
SERVICE CMNT-IMP: NORMAL

## 2024-10-03 ENCOUNTER — OFFICE VISIT (OUTPATIENT)
Age: 34
End: 2024-10-03
Payer: COMMERCIAL

## 2024-10-03 ENCOUNTER — HOSPITAL ENCOUNTER (OUTPATIENT)
Facility: HOSPITAL | Age: 34
Discharge: HOME | End: 2024-10-06
Attending: SPECIALIST
Payer: COMMERCIAL

## 2024-10-03 VITALS
DIASTOLIC BLOOD PRESSURE: 80 MMHG | HEART RATE: 90 BPM | RESPIRATION RATE: 17 BRPM | WEIGHT: 271 LBS | SYSTOLIC BLOOD PRESSURE: 126 MMHG | HEIGHT: 65 IN | OXYGEN SATURATION: 98 % | BODY MASS INDEX: 45.15 KG/M2

## 2024-10-03 DIAGNOSIS — J32.0 MAXILLARY SINUSITIS, CHRONIC: ICD-10-CM

## 2024-10-03 DIAGNOSIS — R53.1 RIGHT SIDED WEAKNESS: ICD-10-CM

## 2024-10-03 DIAGNOSIS — R20.0 NUMBNESS AND TINGLING OF RIGHT FACE: Primary | ICD-10-CM

## 2024-10-03 DIAGNOSIS — R20.2 FACIAL PARESTHESIA: ICD-10-CM

## 2024-10-03 DIAGNOSIS — R22.0 RIGHT FACIAL SWELLING: ICD-10-CM

## 2024-10-03 DIAGNOSIS — R20.2 NUMBNESS AND TINGLING OF RIGHT FACE: Primary | ICD-10-CM

## 2024-10-03 DIAGNOSIS — J34.89 SINUS PRESSURE: ICD-10-CM

## 2024-10-03 PROCEDURE — 99214 OFFICE O/P EST MOD 30 MIN: CPT

## 2024-10-03 PROCEDURE — 70486 CT MAXILLOFACIAL W/O DYE: CPT

## 2024-10-03 RX ORDER — LANOLIN ALCOHOL/MO/W.PET/CERES
400 CREAM (GRAM) TOPICAL DAILY
COMMUNITY

## 2024-10-03 RX ORDER — AMITRIPTYLINE HYDROCHLORIDE 10 MG/1
20 TABLET ORAL NIGHTLY
Qty: 180 TABLET | Refills: 1 | Status: SHIPPED | OUTPATIENT
Start: 2024-10-03

## 2024-10-03 ASSESSMENT — PATIENT HEALTH QUESTIONNAIRE - PHQ9
SUM OF ALL RESPONSES TO PHQ9 QUESTIONS 1 & 2: 0
2. FEELING DOWN, DEPRESSED OR HOPELESS: NOT AT ALL
1. LITTLE INTEREST OR PLEASURE IN DOING THINGS: NOT AT ALL
SUM OF ALL RESPONSES TO PHQ QUESTIONS 1-9: 0

## 2024-10-03 ASSESSMENT — ENCOUNTER SYMPTOMS
SINUS PRESSURE: 1
SINUS PAIN: 1
PHOTOPHOBIA: 0

## 2024-10-03 NOTE — PROGRESS NOTES
Chief Complaint   Patient presents with    Follow-up     Numbness and tingling in right side of face      Vitals:    10/03/24 1420   BP: 126/80   Pulse: 90   Resp: 17   SpO2: 98%      
on 10/3/2024)     No current facility-administered medications for this visit.           Neurologic Exam     Mental Status   Oriented to person, place, and time.   Speech: speech is normal   Level of consciousness: alert    Cranial Nerves   Cranial nerves II through XII intact.     CN III, IV, VI   Pupils are equal, round, and reactive to light.    CN VII   Facial expression full, symmetric.     Motor Exam   Muscle bulk: normal    Strength   Strength 5/5 throughout.     Sensory Exam   Light touch normal.     Gait, Coordination, and Reflexes     Gait  Gait: normal      /80 (Site: Left Upper Arm, Position: Sitting, Cuff Size: Large Adult)   Pulse 90   Resp 17   Ht 1.651 m (5' 5\")   Wt 122.9 kg (271 lb)   SpO2 98%   BMI 45.10 kg/m²       CT Results (maximum last 3):  CT Result (most recent):  CT BRAIN PERFUSION 03/21/2023    Narrative  EXAM:  CTA CODE NEURO HEAD AND NECK W CONT    INDICATION:   Code Stroke    COMPARISON:  None.    CONTRAST:  150 mL of Isovue-370.    TECHNIQUE:  Unenhanced  images were obtained to localize the volume for  acquisition.  Multislice helical axial CT angiography was performed from the  aortic arch to the top of the head during uneventful rapid bolus intravenous  contrast administration.   Coronal and sagittal reformations and 3D/MIP  post  processing were performed.  CT brain perfusion was performed with generation of hemodynamic maps of multiple  parameters, including cerebral blood flow, cerebral blood volume, and MTT (mean  transit time).  CT dose reduction was achieved through use of a standardized protocol tailored  for this examination and automatic exposure control for dose modulation. This  study was analyzed by the [employer] algorithm.    FINDINGS:    CT Brain Perfusion:  Brain parenchymal perfusion study demonstrates no suggestion of acute infarct.  No regional areas of elevated Tmax, decreased cerebral blood flow or blood  volume.  rCBF < 30% = 0 cc.  Tmax > 6

## 2024-10-25 ENCOUNTER — OFFICE VISIT (OUTPATIENT)
Age: 34
End: 2024-10-25

## 2024-10-25 VITALS
HEART RATE: 87 BPM | BODY MASS INDEX: 45.15 KG/M2 | HEIGHT: 65 IN | RESPIRATION RATE: 18 BRPM | WEIGHT: 271 LBS | OXYGEN SATURATION: 98 % | TEMPERATURE: 99.1 F | SYSTOLIC BLOOD PRESSURE: 121 MMHG | DIASTOLIC BLOOD PRESSURE: 80 MMHG

## 2024-10-25 DIAGNOSIS — N64.52 BREAST DISCHARGE: ICD-10-CM

## 2024-10-25 DIAGNOSIS — Z01.419 WELL WOMAN EXAM WITH ROUTINE GYNECOLOGICAL EXAM: Primary | ICD-10-CM

## 2024-10-25 DIAGNOSIS — E66.01 MORBID OBESITY: ICD-10-CM

## 2024-10-25 DIAGNOSIS — N64.4 MASTALGIA: ICD-10-CM

## 2024-10-25 SDOH — ECONOMIC STABILITY: FOOD INSECURITY: WITHIN THE PAST 12 MONTHS, THE FOOD YOU BOUGHT JUST DIDN'T LAST AND YOU DIDN'T HAVE MONEY TO GET MORE.: NEVER TRUE

## 2024-10-25 SDOH — ECONOMIC STABILITY: FOOD INSECURITY: WITHIN THE PAST 12 MONTHS, YOU WORRIED THAT YOUR FOOD WOULD RUN OUT BEFORE YOU GOT MONEY TO BUY MORE.: SOMETIMES TRUE

## 2024-10-25 SDOH — ECONOMIC STABILITY: INCOME INSECURITY: HOW HARD IS IT FOR YOU TO PAY FOR THE VERY BASICS LIKE FOOD, HOUSING, MEDICAL CARE, AND HEATING?: SOMEWHAT HARD

## 2024-10-25 ASSESSMENT — PATIENT HEALTH QUESTIONNAIRE - PHQ9
2. FEELING DOWN, DEPRESSED OR HOPELESS: NOT AT ALL
SUM OF ALL RESPONSES TO PHQ QUESTIONS 1-9: 0
1. LITTLE INTEREST OR PLEASURE IN DOING THINGS: NOT AT ALL
SUM OF ALL RESPONSES TO PHQ QUESTIONS 1-9: 0
SUM OF ALL RESPONSES TO PHQ9 QUESTIONS 1 & 2: 0
SUM OF ALL RESPONSES TO PHQ QUESTIONS 1-9: 0
SUM OF ALL RESPONSES TO PHQ QUESTIONS 1-9: 0

## 2024-10-25 NOTE — PROGRESS NOTES
[facility] Office Visit     Assessment/ Plan: Vy Galaviz is a 34 y.o. female presenting for:    Well-Woman Visit - Problem list reviewed and updated. Review of PMH, PSH, FH, social history, medications and allergies. Recent labs and imaging results reviewed.   -- healthy lifestyle including diet and exercise reviewed  -- mental health screening: PHQ2 (0)  -- Pap smear: updated today  -- mammogram: referred for diagnostic given breast symptoms/findings  -- colonoscopy: n/a  -- immunizations: reviewed  -- Hep C screening: deferred  -- DEXA: n/a   -- lung cancer screening: n/a    Breast Discharge  Mastalgia  Breast Skin Changes - Referral for diagnostic mammogram.     Obesity - Reviewed diet/physical activity, encouraged more protein, regular aerobic and muscle-building exercise. Will obtain risk stratification labs given plateau recently with weight. Reasonable candidate for weight-loss medication, defer pending labs. Has established with nutritionist.     30 minutes were spent on the day of this encounter both with the patient and in related activities including chart review, care coordination and counseling.     Patient instructions were discussed and/or provided in the AVS. The patient understands and agrees to the plan.    RETURN TO CARE: prn or annually   Future Appointments   Date Time Provider Department Center   11/25/2024  1:30 PM Eyal Bass APRN - NP [facility]       Subjective:  Chief Complaint   Patient presents with    Weight Management     Patient states that she would like to discuss her weight. She has been on a keto diet and saw a dietician. She would like to check her labs and she is fasting.She feels like her left breast is a lot bigger than the other. She has had brown and yellow discharge. No other concerns.      HPI:   Vy Galaviz is a 34 y.o. female with PMH of obesity, PTSD here for WWV.    Menstrual Cycle/LMP: history of abnormal pap

## 2024-10-25 NOTE — PROGRESS NOTES
Vy Galaviz is a 34 y.o. female      Chief Complaint   Patient presents with    Weight Management     Patient states that she would like to discuss her weight. She has been on a keto diet and saw a dietician. She would like to check her labs and she is fasting.She feels like her left breast is a lot bigger than the other. She has had brown and yellow discharge. No other concerns.        \"Have you been to the ER, urgent care clinic since your last visit?  Hospitalized since your last visit?\"    NO    “Have you seen or consulted any other health care providers outside of [facility] since your last visit?”    NO         Vitals:    10/25/24 0803   BP: 121/80   Site: Right Upper Arm   Position: Sitting   Pulse: 87   Resp: 18   Temp: 99.1 °F (37.3 °C)   TempSrc: Oral   SpO2: 98%   Weight: 122.9 kg (271 lb)   Height: 1.651 m (5' 5\")            Health Maintenance Due   Topic Date Due    Varicella vaccine (1 of 2 - 13+ 2-dose series) Never done    Polio vaccine (2 of 3 - 4-dose series) 07/22/2008    Hepatitis C screen  Never done    Hepatitis B vaccine (1 of 3 - 19+ 3-dose series) Never done    Cervical cancer screen  08/18/2020    Flu vaccine (1) 08/01/2024    COVID-19 Vaccine (1 - 2023-24 season) Never done         Medication Reconciliation completed, changes noted.  Please  Update medication list.\

## 2024-10-26 LAB
25(OH)D3+25(OH)D2 SERPL-MCNC: 21.1 NG/ML (ref 30–100)
ALBUMIN SERPL-MCNC: 4.1 G/DL (ref 3.9–4.9)
ALP SERPL-CCNC: 71 IU/L (ref 44–121)
ALT SERPL-CCNC: 19 IU/L (ref 0–32)
AST SERPL-CCNC: 19 IU/L (ref 0–40)
BASOPHILS # BLD AUTO: 0.1 X10E3/UL (ref 0–0.2)
BASOPHILS NFR BLD AUTO: 1 %
BILIRUB SERPL-MCNC: 0.3 MG/DL (ref 0–1.2)
BUN SERPL-MCNC: 11 MG/DL (ref 6–20)
BUN/CREAT SERPL: 14 (ref 9–23)
C PEPTIDE SERPL-MCNC: 3.6 NG/ML (ref 1.1–4.4)
CALCIUM SERPL-MCNC: 9.1 MG/DL (ref 8.7–10.2)
CHLORIDE SERPL-SCNC: 100 MMOL/L (ref 96–106)
CHOLEST SERPL-MCNC: 240 MG/DL (ref 100–199)
CO2 SERPL-SCNC: 27 MMOL/L (ref 20–29)
CREAT SERPL-MCNC: 0.77 MG/DL (ref 0.57–1)
EGFRCR SERPLBLD CKD-EPI 2021: 104 ML/MIN/1.73
EOSINOPHIL # BLD AUTO: 0.2 X10E3/UL (ref 0–0.4)
EOSINOPHIL NFR BLD AUTO: 2 %
ERYTHROCYTE [DISTWIDTH] IN BLOOD BY AUTOMATED COUNT: 12.4 % (ref 11.7–15.4)
GLOBULIN SER CALC-MCNC: 2.9 G/DL (ref 1.5–4.5)
GLUCOSE SERPL-MCNC: 93 MG/DL (ref 70–99)
HBA1C MFR BLD: 5.5 % (ref 4.8–5.6)
HCT VFR BLD AUTO: 41.2 % (ref 34–46.6)
HDLC SERPL-MCNC: 49 MG/DL
HGB BLD-MCNC: 13.3 G/DL (ref 11.1–15.9)
IMM GRANULOCYTES # BLD AUTO: 0 X10E3/UL (ref 0–0.1)
IMM GRANULOCYTES NFR BLD AUTO: 0 %
IMP & REVIEW OF LAB RESULTS: NORMAL
LDLC SERPL CALC-MCNC: 167 MG/DL (ref 0–99)
LYMPHOCYTES # BLD AUTO: 2.2 X10E3/UL (ref 0.7–3.1)
LYMPHOCYTES NFR BLD AUTO: 35 %
MCH RBC QN AUTO: 28.3 PG (ref 26.6–33)
MCHC RBC AUTO-ENTMCNC: 32.3 G/DL (ref 31.5–35.7)
MCV RBC AUTO: 88 FL (ref 79–97)
MONOCYTES # BLD AUTO: 0.6 X10E3/UL (ref 0.1–0.9)
MONOCYTES NFR BLD AUTO: 9 %
NEUTROPHILS # BLD AUTO: 3.2 X10E3/UL (ref 1.4–7)
NEUTROPHILS NFR BLD AUTO: 53 %
PLATELET # BLD AUTO: 298 X10E3/UL (ref 150–450)
POTASSIUM SERPL-SCNC: 4.1 MMOL/L (ref 3.5–5.2)
PROT SERPL-MCNC: 7 G/DL (ref 6–8.5)
RBC # BLD AUTO: 4.7 X10E6/UL (ref 3.77–5.28)
SODIUM SERPL-SCNC: 139 MMOL/L (ref 134–144)
T4 FREE SERPL-MCNC: 1.34 NG/DL (ref 0.82–1.77)
TRIGL SERPL-MCNC: 134 MG/DL (ref 0–149)
TSH SERPL DL<=0.005 MIU/L-ACNC: 1.5 UIU/ML (ref 0.45–4.5)
VLDLC SERPL CALC-MCNC: 24 MG/DL (ref 5–40)
WBC # BLD AUTO: 6.2 X10E3/UL (ref 3.4–10.8)

## 2024-10-29 LAB — PROLACTIN SERPL-MCNC: 9.5 NG/ML (ref 4.8–33.4)

## 2024-10-31 LAB
CYTOLOGIST CVX/VAG CYTO: NORMAL
CYTOLOGY CVX/VAG DOC CYTO: NORMAL
CYTOLOGY CVX/VAG DOC THIN PREP: NORMAL
DX ICD CODE: NORMAL
HPV GENOTYPE REFLEX: NORMAL
HPV I/H RISK 4 DNA CVX QL PROBE+SIG AMP: NEGATIVE
Lab: NORMAL
OTHER STN SPEC: NORMAL
STAT OF ADQ CVX/VAG CYTO-IMP: NORMAL

## 2024-11-01 LAB — CORTIS AM PEAK SERPL-MCNC: 9.9 UG/DL (ref 6.2–19.4)

## 2024-11-13 ENCOUNTER — PATIENT MESSAGE (OUTPATIENT)
Age: 34
End: 2024-11-13

## 2024-11-13 DIAGNOSIS — G50.0 TRIGEMINAL NEURALGIA: Primary | ICD-10-CM

## 2024-12-03 DIAGNOSIS — E55.9 VITAMIN D DEFICIENCY: Primary | ICD-10-CM

## 2025-01-07 PROBLEM — G50.0 TRIGEMINAL NEURALGIA: Status: ACTIVE | Noted: 2025-01-07

## 2025-06-18 ENCOUNTER — OFFICE VISIT (OUTPATIENT)
Age: 35
End: 2025-06-18

## 2025-06-18 VITALS
OXYGEN SATURATION: 96 % | HEIGHT: 64 IN | HEART RATE: 65 BPM | BODY MASS INDEX: 40.29 KG/M2 | DIASTOLIC BLOOD PRESSURE: 73 MMHG | RESPIRATION RATE: 16 BRPM | WEIGHT: 236 LBS | SYSTOLIC BLOOD PRESSURE: 119 MMHG | TEMPERATURE: 100.3 F

## 2025-06-18 DIAGNOSIS — K21.9 GASTROESOPHAGEAL REFLUX DISEASE, UNSPECIFIED WHETHER ESOPHAGITIS PRESENT: Primary | ICD-10-CM

## 2025-06-18 RX ORDER — OMEPRAZOLE 20 MG/1
20 CAPSULE, DELAYED RELEASE ORAL
Qty: 180 CAPSULE | Refills: 0 | Status: SHIPPED | OUTPATIENT
Start: 2025-06-18

## 2025-06-18 RX ORDER — SUCRALFATE 1 G/1
1 TABLET ORAL 3 TIMES DAILY
Qty: 90 TABLET | Refills: 0 | Status: SHIPPED | OUTPATIENT
Start: 2025-06-18

## 2025-06-18 RX ORDER — FAMOTIDINE 40 MG/1
40 TABLET, FILM COATED ORAL EVERY EVENING
Qty: 30 TABLET | Refills: 0 | Status: SHIPPED | OUTPATIENT
Start: 2025-06-18

## 2025-06-18 ASSESSMENT — ENCOUNTER SYMPTOMS
TROUBLE SWALLOWING: 1
NAUSEA: 1
ALLERGIC/IMMUNOLOGIC NEGATIVE: 1
EYES NEGATIVE: 1
SORE THROAT: 1
RESPIRATORY NEGATIVE: 1

## 2025-06-18 NOTE — PROGRESS NOTES
2025   Vy Galaviz (: 1990) is a 34 y.o. female, patient, here for evaluation of the following chief complaint(s):  Pharyngitis (Patient presents for difficulty swallowing. Reports food feels like it gets stuck in throat and has pain when swallowing fluids. She has also noticed right facial swelling. )       SUBJECTIVE/OBJECTIVE:  34-year-old female presents today for epigastric pain, difficulty swallowing and intermittent nausea.  Patient reports she has difficulty swallowing and has switched over to bland soft foods.  Denies any fever or chills.  Patient has not tried any over-the-counter medications and denies history of acid reflux in the past.  No drooling noted at current time, airway intact and no swelling noted to airway upon assessment.  Advised patient to continue soft bland diet until follow-up with GI.  Advised patient on plan to start PPI and Carafate      History provided by:  Patient   used: No    Pharyngitis  Associated symptoms: nausea and sore throat           Pharyngitis (Patient presents for difficulty swallowing. Reports food feels like it gets stuck in throat and has pain when swallowing fluids. She has also noticed right facial swelling. )             Review of Systems   Constitutional: Negative.    HENT:  Positive for sore throat and trouble swallowing.    Eyes: Negative.    Respiratory: Negative.     Cardiovascular: Negative.    Gastrointestinal:  Positive for nausea.        +epigastric pain    Endocrine: Negative.    Genitourinary: Negative.    Musculoskeletal: Negative.    Skin: Negative.    Allergic/Immunologic: Negative.    Neurological: Negative.    Hematological: Negative.    Psychiatric/Behavioral: Negative.     All other systems reviewed and are negative.        Physical Exam  Vitals and nursing note reviewed.   Constitutional:       General: She is not in acute distress.     Appearance: Normal appearance. She is not

## 2025-06-18 NOTE — PATIENT INSTRUCTIONS
Thank you for visiting [facility] today.    Do not eat meals or dink carbonated beverages within 3 hours of bedtime  Decrease the amount of fatty, fried and spicy foods to decrease gastric acid production  Raise the head of the bed using 4 to 6 inch blocks especially if night time symptoms are present  Decrease or eliminate NSAID use, nicotine or alcohol  Avoid foods with chocolate, peppermint, citrus, tomato or coffee  Medications should be taken one hour before meals    Follow up with GI for further testing/treatment.

## 2025-08-05 ENCOUNTER — TELEPHONE (OUTPATIENT)
Age: 35
End: 2025-08-05

## 2025-08-11 ENCOUNTER — OFFICE VISIT (OUTPATIENT)
Age: 35
End: 2025-08-11
Payer: COMMERCIAL

## 2025-08-11 VITALS
HEIGHT: 64 IN | BODY MASS INDEX: 36.33 KG/M2 | TEMPERATURE: 98.7 F | HEART RATE: 73 BPM | OXYGEN SATURATION: 98 % | RESPIRATION RATE: 14 BRPM | DIASTOLIC BLOOD PRESSURE: 78 MMHG | WEIGHT: 212.8 LBS | SYSTOLIC BLOOD PRESSURE: 120 MMHG

## 2025-08-11 DIAGNOSIS — R13.19 OTHER DYSPHAGIA: Primary | ICD-10-CM

## 2025-08-11 DIAGNOSIS — R49.0 HOARSENESS OF VOICE: ICD-10-CM

## 2025-08-11 PROCEDURE — 99213 OFFICE O/P EST LOW 20 MIN: CPT

## 2025-08-11 ASSESSMENT — ENCOUNTER SYMPTOMS
BLOOD IN STOOL: 0
NAUSEA: 1
DIARRHEA: 0
COUGH: 0
VOMITING: 0
ABDOMINAL DISTENTION: 0
CONSTIPATION: 1
ABDOMINAL PAIN: 0
CHOKING: 1

## 2025-08-22 ENCOUNTER — HOSPITAL ENCOUNTER (OUTPATIENT)
Facility: HOSPITAL | Age: 35
Discharge: HOME | End: 2025-08-25
Payer: COMMERCIAL

## 2025-08-22 DIAGNOSIS — R49.0 HOARSENESS OF VOICE: ICD-10-CM

## 2025-08-22 DIAGNOSIS — R13.19 OTHER DYSPHAGIA: ICD-10-CM

## 2025-08-22 PROCEDURE — 76536 US EXAM OF HEAD AND NECK: CPT

## 2025-08-28 ENCOUNTER — RESULTS FOLLOW-UP (OUTPATIENT)
Age: 35
End: 2025-08-28